# Patient Record
Sex: FEMALE | Race: WHITE | NOT HISPANIC OR LATINO | Employment: FULL TIME | URBAN - METROPOLITAN AREA
[De-identification: names, ages, dates, MRNs, and addresses within clinical notes are randomized per-mention and may not be internally consistent; named-entity substitution may affect disease eponyms.]

---

## 2017-01-04 ENCOUNTER — ALLSCRIPTS OFFICE VISIT (OUTPATIENT)
Dept: OTHER | Facility: OTHER | Age: 22
End: 2017-01-04

## 2017-01-06 ENCOUNTER — GENERIC CONVERSION - ENCOUNTER (OUTPATIENT)
Dept: OTHER | Facility: OTHER | Age: 22
End: 2017-01-06

## 2017-01-19 ENCOUNTER — GENERIC CONVERSION - ENCOUNTER (OUTPATIENT)
Dept: OTHER | Facility: OTHER | Age: 22
End: 2017-01-19

## 2017-01-19 ENCOUNTER — ALLSCRIPTS OFFICE VISIT (OUTPATIENT)
Dept: PERINATAL CARE | Facility: CLINIC | Age: 22
End: 2017-01-19
Payer: COMMERCIAL

## 2017-01-19 PROCEDURE — 76817 TRANSVAGINAL US OBSTETRIC: CPT | Performed by: OBSTETRICS & GYNECOLOGY

## 2017-01-19 PROCEDURE — 76805 OB US >/= 14 WKS SNGL FETUS: CPT | Performed by: OBSTETRICS & GYNECOLOGY

## 2017-01-31 ENCOUNTER — GENERIC CONVERSION - ENCOUNTER (OUTPATIENT)
Dept: OTHER | Facility: OTHER | Age: 22
End: 2017-01-31

## 2017-03-03 ENCOUNTER — GENERIC CONVERSION - ENCOUNTER (OUTPATIENT)
Dept: OTHER | Facility: OTHER | Age: 22
End: 2017-03-03

## 2017-03-03 ENCOUNTER — ALLSCRIPTS OFFICE VISIT (OUTPATIENT)
Dept: OTHER | Facility: OTHER | Age: 22
End: 2017-03-03

## 2017-03-23 ENCOUNTER — LAB REQUISITION (OUTPATIENT)
Dept: LAB | Facility: HOSPITAL | Age: 22
End: 2017-03-23
Payer: COMMERCIAL

## 2017-03-23 ENCOUNTER — GENERIC CONVERSION - ENCOUNTER (OUTPATIENT)
Dept: OTHER | Facility: OTHER | Age: 22
End: 2017-03-23

## 2017-03-23 DIAGNOSIS — Z34.83 ENCOUNTER FOR SUPERVISION OF OTHER NORMAL PREGNANCY, THIRD TRIMESTER: ICD-10-CM

## 2017-03-23 LAB
BASOPHILS # BLD AUTO: 0.01 THOUSANDS/ΜL (ref 0–0.1)
BASOPHILS NFR BLD AUTO: 0 % (ref 0–1)
EOSINOPHIL # BLD AUTO: 0.08 THOUSAND/ΜL (ref 0–0.61)
EOSINOPHIL NFR BLD AUTO: 1 % (ref 0–6)
ERYTHROCYTE [DISTWIDTH] IN BLOOD BY AUTOMATED COUNT: 12.9 % (ref 11.6–15.1)
GLUCOSE 1H P 50 G GLC PO SERPL-MCNC: 138 MG/DL
HCT VFR BLD AUTO: 36.3 % (ref 34.8–46.1)
HGB BLD-MCNC: 12.3 G/DL (ref 11.5–15.4)
LYMPHOCYTES # BLD AUTO: 1.48 THOUSANDS/ΜL (ref 0.6–4.47)
LYMPHOCYTES NFR BLD AUTO: 16 % (ref 14–44)
MCH RBC QN AUTO: 31.2 PG (ref 26.8–34.3)
MCHC RBC AUTO-ENTMCNC: 33.9 G/DL (ref 31.4–37.4)
MCV RBC AUTO: 92 FL (ref 82–98)
MONOCYTES # BLD AUTO: 0.61 THOUSAND/ΜL (ref 0.17–1.22)
MONOCYTES NFR BLD AUTO: 7 % (ref 4–12)
NEUTROPHILS # BLD AUTO: 6.83 THOUSANDS/ΜL (ref 1.85–7.62)
NEUTS SEG NFR BLD AUTO: 76 % (ref 43–75)
NRBC BLD AUTO-RTO: 0 /100 WBCS
PLATELET # BLD AUTO: 195 THOUSANDS/UL (ref 149–390)
PMV BLD AUTO: 10.8 FL (ref 8.9–12.7)
RBC # BLD AUTO: 3.94 MILLION/UL (ref 3.81–5.12)
WBC # BLD AUTO: 9.05 THOUSAND/UL (ref 4.31–10.16)

## 2017-03-23 PROCEDURE — 85025 COMPLETE CBC W/AUTO DIFF WBC: CPT | Performed by: NURSE PRACTITIONER

## 2017-03-23 PROCEDURE — 82950 GLUCOSE TEST: CPT | Performed by: NURSE PRACTITIONER

## 2017-03-27 DIAGNOSIS — R73.09 OTHER ABNORMAL GLUCOSE: ICD-10-CM

## 2017-03-28 ENCOUNTER — APPOINTMENT (OUTPATIENT)
Dept: LAB | Facility: HOSPITAL | Age: 22
End: 2017-03-28
Payer: COMMERCIAL

## 2017-03-28 DIAGNOSIS — R73.09 OTHER ABNORMAL GLUCOSE: ICD-10-CM

## 2017-03-28 LAB
GLUCOSE 1H P 100 G GLC PO SERPL-MCNC: 122 MG/DL (ref 65–179)
GLUCOSE 2H P 100 G GLC PO SERPL-MCNC: 88 MG/DL (ref 65–154)
GLUCOSE 3H P 100 G GLC PO SERPL-MCNC: 94 MG/DL (ref 65–139)
GLUCOSE P FAST SERPL-MCNC: 86 MG/DL (ref 65–99)

## 2017-03-28 PROCEDURE — 82952 GTT-ADDED SAMPLES: CPT

## 2017-03-28 PROCEDURE — 82951 GLUCOSE TOLERANCE TEST (GTT): CPT

## 2017-03-28 PROCEDURE — 36415 COLL VENOUS BLD VENIPUNCTURE: CPT

## 2017-04-11 ENCOUNTER — ALLSCRIPTS OFFICE VISIT (OUTPATIENT)
Dept: OTHER | Facility: OTHER | Age: 22
End: 2017-04-11

## 2017-04-11 ENCOUNTER — GENERIC CONVERSION - ENCOUNTER (OUTPATIENT)
Dept: OTHER | Facility: OTHER | Age: 22
End: 2017-04-11

## 2017-04-27 ENCOUNTER — ALLSCRIPTS OFFICE VISIT (OUTPATIENT)
Dept: OTHER | Facility: OTHER | Age: 22
End: 2017-04-27

## 2017-05-11 ENCOUNTER — GENERIC CONVERSION - ENCOUNTER (OUTPATIENT)
Dept: OTHER | Facility: OTHER | Age: 22
End: 2017-05-11

## 2017-05-18 ENCOUNTER — GENERIC CONVERSION - ENCOUNTER (OUTPATIENT)
Dept: OTHER | Facility: OTHER | Age: 22
End: 2017-05-18

## 2017-05-22 ENCOUNTER — ALLSCRIPTS OFFICE VISIT (OUTPATIENT)
Dept: PERINATAL CARE | Facility: CLINIC | Age: 22
End: 2017-05-22
Payer: COMMERCIAL

## 2017-05-22 ENCOUNTER — GENERIC CONVERSION - ENCOUNTER (OUTPATIENT)
Dept: OTHER | Facility: OTHER | Age: 22
End: 2017-05-22

## 2017-05-22 PROCEDURE — 76816 OB US FOLLOW-UP PER FETUS: CPT | Performed by: OBSTETRICS & GYNECOLOGY

## 2017-05-22 PROCEDURE — 59025 FETAL NON-STRESS TEST: CPT | Performed by: OBSTETRICS & GYNECOLOGY

## 2017-05-22 PROCEDURE — 76821 MIDDLE CEREBRAL ARTERY ECHO: CPT | Performed by: OBSTETRICS & GYNECOLOGY

## 2017-05-22 PROCEDURE — 76820 UMBILICAL ARTERY ECHO: CPT | Performed by: OBSTETRICS & GYNECOLOGY

## 2017-05-24 ENCOUNTER — ALLSCRIPTS OFFICE VISIT (OUTPATIENT)
Dept: OTHER | Facility: OTHER | Age: 22
End: 2017-05-24

## 2017-05-30 ENCOUNTER — GENERIC CONVERSION - ENCOUNTER (OUTPATIENT)
Dept: OTHER | Facility: OTHER | Age: 22
End: 2017-05-30

## 2017-05-30 ENCOUNTER — ALLSCRIPTS OFFICE VISIT (OUTPATIENT)
Dept: PERINATAL CARE | Facility: CLINIC | Age: 22
End: 2017-05-30
Payer: COMMERCIAL

## 2017-05-30 PROCEDURE — 59025 FETAL NON-STRESS TEST: CPT | Performed by: OBSTETRICS & GYNECOLOGY

## 2017-05-30 PROCEDURE — 76820 UMBILICAL ARTERY ECHO: CPT | Performed by: OBSTETRICS & GYNECOLOGY

## 2017-05-30 PROCEDURE — 76821 MIDDLE CEREBRAL ARTERY ECHO: CPT | Performed by: OBSTETRICS & GYNECOLOGY

## 2017-05-30 PROCEDURE — 76815 OB US LIMITED FETUS(S): CPT | Performed by: OBSTETRICS & GYNECOLOGY

## 2017-06-01 ENCOUNTER — ALLSCRIPTS OFFICE VISIT (OUTPATIENT)
Dept: OTHER | Facility: OTHER | Age: 22
End: 2017-06-01

## 2017-06-02 ENCOUNTER — GENERIC CONVERSION - ENCOUNTER (OUTPATIENT)
Dept: OTHER | Facility: OTHER | Age: 22
End: 2017-06-02

## 2017-06-04 ENCOUNTER — HOSPITAL ENCOUNTER (OUTPATIENT)
Facility: HOSPITAL | Age: 22
Discharge: HOME | End: 2017-06-04
Attending: OBSTETRICS & GYNECOLOGY | Admitting: OBSTETRICS & GYNECOLOGY
Payer: COMMERCIAL

## 2017-06-04 VITALS
WEIGHT: 155 LBS | TEMPERATURE: 98.2 F | DIASTOLIC BLOOD PRESSURE: 82 MMHG | BODY MASS INDEX: 24.33 KG/M2 | RESPIRATION RATE: 18 BRPM | SYSTOLIC BLOOD PRESSURE: 128 MMHG | HEIGHT: 67 IN | HEART RATE: 89 BPM

## 2017-06-04 VITALS
DIASTOLIC BLOOD PRESSURE: 81 MMHG | RESPIRATION RATE: 18 BRPM | TEMPERATURE: 98.1 F | BODY MASS INDEX: 24.33 KG/M2 | SYSTOLIC BLOOD PRESSURE: 134 MMHG | HEIGHT: 67 IN | WEIGHT: 155 LBS | HEART RATE: 98 BPM

## 2017-06-04 DIAGNOSIS — R10.9 ABDOMINAL PAIN AFFECTING PREGNANCY: ICD-10-CM

## 2017-06-04 DIAGNOSIS — O26.899 ABDOMINAL PAIN AFFECTING PREGNANCY: ICD-10-CM

## 2017-06-04 DIAGNOSIS — O47.9 FALSE LABOR: ICD-10-CM

## 2017-06-04 PROBLEM — Z3A.39 39 WEEKS GESTATION OF PREGNANCY: Status: ACTIVE | Noted: 2017-06-04

## 2017-06-04 PROCEDURE — 99213 OFFICE O/P EST LOW 20 MIN: CPT

## 2017-06-04 PROCEDURE — 99214 OFFICE O/P EST MOD 30 MIN: CPT

## 2017-06-04 PROCEDURE — G0463 HOSPITAL OUTPT CLINIC VISIT: HCPCS

## 2017-06-05 ENCOUNTER — HOSPITAL ENCOUNTER (INPATIENT)
Facility: HOSPITAL | Age: 22
LOS: 2 days | Discharge: HOME | End: 2017-06-07
Attending: OBSTETRICS & GYNECOLOGY | Admitting: OBSTETRICS & GYNECOLOGY
Payer: COMMERCIAL

## 2017-06-05 DIAGNOSIS — O26.899 ABDOMINAL PAIN AFFECTING PREGNANCY: ICD-10-CM

## 2017-06-05 DIAGNOSIS — R10.9 ABDOMINAL PAIN AFFECTING PREGNANCY: ICD-10-CM

## 2017-06-05 DIAGNOSIS — Z3A.39 39 WEEKS GESTATION OF PREGNANCY: Primary | ICD-10-CM

## 2017-06-05 PROBLEM — O47.9 FALSE LABOR: Status: RESOLVED | Noted: 2017-06-04 | Resolved: 2017-06-05

## 2017-06-05 LAB
ABO GROUP BLD: NORMAL
BASE EXCESS BLDCOA CALC-SCNC: -3.2 MMOL/L (ref 3–11)
BASE EXCESS BLDCOV CALC-SCNC: -3 MMOL/L (ref 1–9)
BASOPHILS # BLD AUTO: 0.02 THOUSANDS/ΜL (ref 0–0.1)
BASOPHILS NFR BLD AUTO: 0 % (ref 0–1)
BLD GP AB SCN SERPL QL: NEGATIVE
EOSINOPHIL # BLD AUTO: 0.04 THOUSAND/ΜL (ref 0–0.61)
EOSINOPHIL NFR BLD AUTO: 0 % (ref 0–6)
ERYTHROCYTE [DISTWIDTH] IN BLOOD BY AUTOMATED COUNT: 13.4 % (ref 11.6–15.1)
HCO3 BLDCOA-SCNC: 25.8 MMOL/L (ref 17.3–27.3)
HCO3 BLDCOV-SCNC: 22.3 MMOL/L (ref 12.2–28.6)
HCT VFR BLD AUTO: 37.3 % (ref 34.8–46.1)
HGB BLD-MCNC: 13.1 G/DL (ref 11.5–15.4)
LYMPHOCYTES # BLD AUTO: 1.48 THOUSANDS/ΜL (ref 0.6–4.47)
LYMPHOCYTES NFR BLD AUTO: 10 % (ref 14–44)
MCH RBC QN AUTO: 32 PG (ref 26.8–34.3)
MCHC RBC AUTO-ENTMCNC: 35.1 G/DL (ref 31.4–37.4)
MCV RBC AUTO: 91 FL (ref 82–98)
MONOCYTES # BLD AUTO: 1.53 THOUSAND/ΜL (ref 0.17–1.22)
MONOCYTES NFR BLD AUTO: 10 % (ref 4–12)
NEUTROPHILS # BLD AUTO: 12.41 THOUSANDS/ΜL (ref 1.85–7.62)
NEUTS SEG NFR BLD AUTO: 80 % (ref 43–75)
NRBC BLD AUTO-RTO: 0 /100 WBCS
O2 CT VFR BLDCOA CALC: 11.5 ML/DL
OXYHGB MFR BLDCOA: 48 %
OXYHGB MFR BLDCOV: 79.2 %
PCO2 BLDCOA: 61.8 MM[HG] (ref 30–60)
PCO2 BLDCOV: 40.8 MM HG (ref 27–43)
PH BLDCOA: 7.24 [PH] (ref 7.23–7.43)
PH BLDCOV: 7.36 [PH] (ref 7.19–7.49)
PLATELET # BLD AUTO: 152 THOUSANDS/UL (ref 149–390)
PMV BLD AUTO: 11.9 FL (ref 8.9–12.7)
PO2 BLDCOA: 24.3 MM HG (ref 5–25)
PO2 BLDCOV: 36.4 MM HG (ref 15–45)
RBC # BLD AUTO: 4.09 MILLION/UL (ref 3.81–5.12)
RH BLD: POSITIVE
SAO2 % BLDCOV: 18.4 ML/DL
SPECIMEN EXPIRATION DATE: NORMAL
WBC # BLD AUTO: 15.48 THOUSAND/UL (ref 4.31–10.16)

## 2017-06-05 PROCEDURE — 86850 RBC ANTIBODY SCREEN: CPT | Performed by: OBSTETRICS & GYNECOLOGY

## 2017-06-05 PROCEDURE — A9270 NON-COVERED ITEM OR SERVICE: HCPCS | Performed by: OBSTETRICS & GYNECOLOGY

## 2017-06-05 PROCEDURE — 0UQMXZZ REPAIR VULVA, EXTERNAL APPROACH: ICD-10-PCS | Performed by: OBSTETRICS & GYNECOLOGY

## 2017-06-05 PROCEDURE — 86592 SYPHILIS TEST NON-TREP QUAL: CPT | Performed by: OBSTETRICS & GYNECOLOGY

## 2017-06-05 PROCEDURE — 0KQM0ZZ REPAIR PERINEUM MUSCLE, OPEN APPROACH: ICD-10-PCS | Performed by: OBSTETRICS & GYNECOLOGY

## 2017-06-05 PROCEDURE — 82805 BLOOD GASES W/O2 SATURATION: CPT | Performed by: OBSTETRICS & GYNECOLOGY

## 2017-06-05 PROCEDURE — 10907ZC DRAINAGE OF AMNIOTIC FLUID, THERAPEUTIC FROM PRODUCTS OF CONCEPTION, VIA NATURAL OR ARTIFICIAL OPENING: ICD-10-PCS | Performed by: OBSTETRICS & GYNECOLOGY

## 2017-06-05 PROCEDURE — G0463 HOSPITAL OUTPT CLINIC VISIT: HCPCS

## 2017-06-05 PROCEDURE — 99213 OFFICE O/P EST LOW 20 MIN: CPT

## 2017-06-05 PROCEDURE — 85025 COMPLETE CBC W/AUTO DIFF WBC: CPT | Performed by: OBSTETRICS & GYNECOLOGY

## 2017-06-05 PROCEDURE — 86900 BLOOD TYPING SEROLOGIC ABO: CPT | Performed by: OBSTETRICS & GYNECOLOGY

## 2017-06-05 PROCEDURE — 86901 BLOOD TYPING SEROLOGIC RH(D): CPT | Performed by: OBSTETRICS & GYNECOLOGY

## 2017-06-05 RX ORDER — OXYCODONE HYDROCHLORIDE AND ACETAMINOPHEN 5; 325 MG/1; MG/1
1 TABLET ORAL EVERY 4 HOURS PRN
Status: DISCONTINUED | OUTPATIENT
Start: 2017-06-05 | End: 2017-06-07 | Stop reason: HOSPADM

## 2017-06-05 RX ORDER — IBUPROFEN 600 MG/1
600 TABLET ORAL EVERY 6 HOURS PRN
Status: DISCONTINUED | OUTPATIENT
Start: 2017-06-05 | End: 2017-06-07 | Stop reason: HOSPADM

## 2017-06-05 RX ORDER — SIMETHICONE 80 MG
80 TABLET,CHEWABLE ORAL 4 TIMES DAILY PRN
Status: DISCONTINUED | OUTPATIENT
Start: 2017-06-05 | End: 2017-06-07 | Stop reason: HOSPADM

## 2017-06-05 RX ORDER — MORPHINE SULFATE 10 MG/ML
10 INJECTION, SOLUTION INTRAMUSCULAR; INTRAVENOUS ONCE
Status: DISCONTINUED | OUTPATIENT
Start: 2017-06-05 | End: 2017-06-05

## 2017-06-05 RX ORDER — ALBUTEROL SULFATE 90 UG/1
2 AEROSOL, METERED RESPIRATORY (INHALATION) EVERY 6 HOURS PRN
Status: DISCONTINUED | OUTPATIENT
Start: 2017-06-05 | End: 2017-06-05

## 2017-06-05 RX ORDER — MORPHINE SULFATE 10 MG/ML
10 INJECTION, SOLUTION INTRAMUSCULAR; INTRAVENOUS ONCE
Status: COMPLETED | OUTPATIENT
Start: 2017-06-05 | End: 2017-06-05

## 2017-06-05 RX ORDER — TERBUTALINE SULFATE 1 MG/ML
INJECTION, SOLUTION SUBCUTANEOUS
Status: DISCONTINUED
Start: 2017-06-05 | End: 2017-06-05 | Stop reason: WASHOUT

## 2017-06-05 RX ORDER — DIAPER,BRIEF,INFANT-TODD,DISP
1 EACH MISCELLANEOUS AS NEEDED
Status: DISCONTINUED | OUTPATIENT
Start: 2017-06-05 | End: 2017-06-07 | Stop reason: HOSPADM

## 2017-06-05 RX ORDER — SODIUM CHLORIDE, SODIUM LACTATE, POTASSIUM CHLORIDE, CALCIUM CHLORIDE 600; 310; 30; 20 MG/100ML; MG/100ML; MG/100ML; MG/100ML
125 INJECTION, SOLUTION INTRAVENOUS CONTINUOUS
Status: DISCONTINUED | OUTPATIENT
Start: 2017-06-05 | End: 2017-06-05

## 2017-06-05 RX ORDER — OXYCODONE HYDROCHLORIDE AND ACETAMINOPHEN 5; 325 MG/1; MG/1
2 TABLET ORAL EVERY 4 HOURS PRN
Status: DISCONTINUED | OUTPATIENT
Start: 2017-06-05 | End: 2017-06-07 | Stop reason: HOSPADM

## 2017-06-05 RX ORDER — BUPIVACAINE HYDROCHLORIDE 2.5 MG/ML
INJECTION, SOLUTION EPIDURAL; INFILTRATION; INTRACAUDAL
Status: COMPLETED
Start: 2017-06-05 | End: 2017-06-05

## 2017-06-05 RX ORDER — ONDANSETRON 2 MG/ML
4 INJECTION INTRAMUSCULAR; INTRAVENOUS ONCE
Status: DISCONTINUED | OUTPATIENT
Start: 2017-06-05 | End: 2017-06-05

## 2017-06-05 RX ORDER — ACETAMINOPHEN 325 MG/1
650 TABLET ORAL EVERY 6 HOURS PRN
Status: DISCONTINUED | OUTPATIENT
Start: 2017-06-05 | End: 2017-06-07 | Stop reason: HOSPADM

## 2017-06-05 RX ORDER — DIPHENHYDRAMINE HCL 25 MG
25 TABLET ORAL EVERY 6 HOURS PRN
Status: DISCONTINUED | OUTPATIENT
Start: 2017-06-05 | End: 2017-06-07 | Stop reason: HOSPADM

## 2017-06-05 RX ORDER — DOCUSATE SODIUM 100 MG/1
100 CAPSULE, LIQUID FILLED ORAL 2 TIMES DAILY
Status: DISCONTINUED | OUTPATIENT
Start: 2017-06-05 | End: 2017-06-07 | Stop reason: HOSPADM

## 2017-06-05 RX ORDER — OXYTOCIN/RINGER'S LACTATE 30/500 ML
PLASTIC BAG, INJECTION (ML) INTRAVENOUS
Status: COMPLETED
Start: 2017-06-05 | End: 2017-06-05

## 2017-06-05 RX ADMIN — SODIUM CHLORIDE 2.5 MILLION UNITS: 9 INJECTION, SOLUTION INTRAVENOUS at 15:45

## 2017-06-05 RX ADMIN — BUPIVACAINE HYDROCHLORIDE 30 ML: 2.5 INJECTION, SOLUTION EPIDURAL; INFILTRATION; INTRACAUDAL at 17:30

## 2017-06-05 RX ADMIN — SODIUM CHLORIDE, SODIUM LACTATE, POTASSIUM CHLORIDE, AND CALCIUM CHLORIDE 999 ML/HR: .6; .31; .03; .02 INJECTION, SOLUTION INTRAVENOUS at 16:29

## 2017-06-05 RX ADMIN — SODIUM CHLORIDE 5 MILLION UNITS: 0.9 INJECTION, SOLUTION INTRAVENOUS at 11:45

## 2017-06-05 RX ADMIN — BENZOCAINE AND MENTHOL: 20; .5 SPRAY TOPICAL at 20:25

## 2017-06-05 RX ADMIN — Medication 30 UNITS: at 17:14

## 2017-06-05 RX ADMIN — MORPHINE SULFATE 10 MG: 10 INJECTION, SOLUTION INTRAMUSCULAR; INTRAVENOUS at 05:30

## 2017-06-05 RX ADMIN — WITCH HAZEL 1 PAD: 500 SOLUTION RECTAL; TOPICAL at 20:25

## 2017-06-06 LAB — RPR SER QL: NORMAL

## 2017-06-06 PROCEDURE — A9270 NON-COVERED ITEM OR SERVICE: HCPCS | Performed by: OBSTETRICS & GYNECOLOGY

## 2017-06-06 RX ADMIN — DOCUSATE SODIUM 100 MG: 100 CAPSULE, LIQUID FILLED ORAL at 17:49

## 2017-06-07 VITALS
HEIGHT: 67 IN | BODY MASS INDEX: 24.33 KG/M2 | TEMPERATURE: 98.1 F | DIASTOLIC BLOOD PRESSURE: 62 MMHG | RESPIRATION RATE: 14 BRPM | HEART RATE: 61 BPM | SYSTOLIC BLOOD PRESSURE: 100 MMHG | WEIGHT: 155 LBS

## 2017-06-07 PROCEDURE — A9270 NON-COVERED ITEM OR SERVICE: HCPCS | Performed by: OBSTETRICS & GYNECOLOGY

## 2017-06-07 RX ORDER — DOCUSATE SODIUM 100 MG/1
100 CAPSULE, LIQUID FILLED ORAL 2 TIMES DAILY
Qty: 10 CAPSULE | Refills: 0
Start: 2017-06-07 | End: 2018-06-24

## 2017-06-07 RX ORDER — IBUPROFEN 600 MG/1
600 TABLET ORAL EVERY 6 HOURS PRN
Qty: 30 TABLET | Refills: 0
Start: 2017-06-07 | End: 2018-06-24

## 2017-06-07 RX ADMIN — DOCUSATE SODIUM 100 MG: 100 CAPSULE, LIQUID FILLED ORAL at 08:39

## 2017-06-09 ENCOUNTER — TELEPHONE (OUTPATIENT)
Dept: LABOR AND DELIVERY | Facility: HOSPITAL | Age: 22
End: 2017-06-09

## 2017-06-14 LAB — PLACENTA IN STORAGE: NORMAL

## 2017-07-14 ENCOUNTER — ALLSCRIPTS OFFICE VISIT (OUTPATIENT)
Dept: OTHER | Facility: OTHER | Age: 22
End: 2017-07-14

## 2017-12-06 ENCOUNTER — GENERIC CONVERSION - ENCOUNTER (OUTPATIENT)
Dept: OTHER | Facility: OTHER | Age: 22
End: 2017-12-06

## 2017-12-06 ENCOUNTER — ALLSCRIPTS OFFICE VISIT (OUTPATIENT)
Dept: OTHER | Facility: OTHER | Age: 22
End: 2017-12-06

## 2018-01-09 NOTE — RESULT NOTES
Verified Results  [facility] Mehran Montez [id number] 06:40PM Tarun Mcclendon Order Number: [id number]    - Patient Instructions: To schedule this appointment, please contact Central Scheduling at [phone]  Test Name Result Flag Reference   US OB TRANSVAGINAL (Report)     FIRST TRIMESTER OBSTETRIC ULTRASOUND, COMPLETE     INDICATION: Dating ultrasound  LMP is 8/16/2016  COMPARISON: None  TECHNIQUE:  Transabdominal ultrasound of the pelvis was performed  Additional transvaginal imaging was then performed to better assess the gestation, myometrial/endometrial architecture and ovarian parenchymal detail  The study includes volumetric    sweeps and traditional still imaging technique  FINDINGS:     A single live intrauterine gestation is identified  Cardiac activity is present  Heart rate of 120 bpm      YOLK SAC: Present and normal in size and appearance  MEAN GESTATIONAL SAC SIZE: 25 mm = 7 weeks 1 days    MEAN CROWN RUMP LENGTH: 7 mm = 6 weeks 4 days    FETAL ANATOMY: Appropriate for gestational age  AMNIOTIC FLUID/SAC SHAPE: Within expected normal range  PLACENTA: The placenta can not be adequately assessed at this early gestational age  There is no significant subchorionic fluid collection  UTERUS/ADNEXA:    The uterus and ovaries are within normal limits  Right corpus luteum cyst is noted  The cervix remains closed  There is trace free fluid  IMPRESSION:     Single live intrauterine gestation at 6 weeks 4 days (range +/- 4 days)  EVANGELINA of 6/10/2017  Workstation performed: [id number]     Signed by:   Paris Ch MD   10/21/16      OB < 14 WEEKS SINGLE OR FIRST DESTINATION LEVEL 2 [id number] 06:08PM Ofelia CHOI Order Number: [id number]    - Patient Instructions: To schedule this appointment, please contact Central Scheduling at [phone]       Test Name Result Flag Reference   US OB < 14 WEEKS SINGLE OR FIRST GESTATION (Report)     FIRST TRIMESTER OBSTETRIC ULTRASOUND, COMPLETE     INDICATION: Dating ultrasound  LMP is 8/16/2016  COMPARISON: None  TECHNIQUE:  Transabdominal ultrasound of the pelvis was performed  Additional transvaginal imaging was then performed to better assess the gestation, myometrial/endometrial architecture and ovarian parenchymal detail  The study includes volumetric    sweeps and traditional still imaging technique  FINDINGS:     A single live intrauterine gestation is identified  Cardiac activity is present  Heart rate of 120 bpm      YOLK SAC: Present and normal in size and appearance  MEAN GESTATIONAL SAC SIZE: 25 mm = 7 weeks 1 days    MEAN CROWN RUMP LENGTH: 7 mm = 6 weeks 4 days    FETAL ANATOMY: Appropriate for gestational age  AMNIOTIC FLUID/SAC SHAPE: Within expected normal range  PLACENTA: The placenta can not be adequately assessed at this early gestational age  There is no significant subchorionic fluid collection  UTERUS/ADNEXA:    The uterus and ovaries are within normal limits  Right corpus luteum cyst is noted  The cervix remains closed  There is trace free fluid  IMPRESSION:     Single live intrauterine gestation at 6 weeks 4 days (range +/- 4 days)  EVANGELINA of 6/10/2017         Workstation performed: [id number]     Signed by:   Janeen Koroma MD   10/20/16

## 2018-01-09 NOTE — RESULT NOTES
Verified Results  (1) PRENATAL PANEL [id number] 05:13PM Elnor Page     Test Name Result Flag Reference   BASOPHILS ABSOLUTE COUNT 0 02 Thousands/?L  0 00-0 10   EOSINOPHILS ABSOLUTE COUNT 0 12 Thousand/?L  0 00-0 61   LYMPHOCYTES ABSOLUTE COUNT 1 99 Thousands/?L  0 60-4 47   MONOCYTES ABSOLUTE COUNT 0 65 Thousand/?L  0 17-1 22   NEUTROPHILS ABSOLUTE COUNT 6 81 Thousands/?L  1 85-7 62   BASOPHILS RELATIVE PERCENT 0 %  0-1   EOSINOPHILS RELATIVE PERCENT 1 %  0-6   HEMATOCRIT 41 6 %  34 8-46 1   HEMOGLOBIN 14 1 g/dL  11 5-15 4   LYMPHOCYTES RELATIVE PERCENT 21 %  14-44   MCH 30 9 pg  26 8-34 3   MCHC 33 9 g/dL  31 4-37 4   MCV 91 fL  82-98   MONOCYTES RELATIVE PERCENT 7 %  4-12   nRBC AUTOMATED 0 /100 WBCs     PLATELET COUNT 594 Thousands/uL  149-390   MPV 10 2 fL  8 9-12 7   RBC COUNT 4 57 Million/uL  3 81-5 12   RDW 12 7 %  11 6-15 1   NEUTROPHILS RELATIVE PERCENT 71 %  43-75   WBC COUNT 9 61 Thousand/uL  4 31-10 16     (1) PRENATAL PANEL [id number] 05:13PM Elnor Page     Test Name Result Flag Reference   HEPATITIS B SURFACE ANTIGEN Non-reactive  Non-reactive, NonReactive - Confirmed     (1) PRENATAL PANEL [id number] 05:13PM Elnor Page     Test Name Result Flag Reference   BILIRUBIN UA Negative  Negative   CLARITY Cloudy     COLOR Yellow     KETONES UA Negative mg/dl  Negative   LEUKOCYTE ESTERASE UA Negative  Negative   NITRITE UA Negative  Negative   BLOOD UA Negative  Negative   PH UA 8 0  4 5-8 0   PROTEIN UA Negative mg/dl  Negative   SPECIFIC GRAVITY UA 1 017  1 003-1 030   GLUCOSE UA Negative mg/dl  Negative   UROBILINOGEN UA 0 2 E U /dl  0 2, 1 0 E U /dl     (1) PRENATAL PANEL [id number] 05:13PM Elnor Page     Test Name Result Flag Reference   ABO GROUPING A     RH FACTOR Positive     ANTIBODY SCREEN Negative       (1) PRENATAL PANEL [id number] 05:13PM Elnor Page     Test Name Result Flag Reference   HIV 1/2 AND P24 Non-Reactive  Non-Reactive   This test detects HIV 1, HIV2 and p24 Antigen  (1) PRENATAL PANEL [id number] 05:13PM Elnor Page     Test Name Result Flag Reference   RPR Non-Reactive  Non-Reactive     (1) URINE CULTURE [id number] 05:13PM Elnor Page     Test Name Result Flag Reference   CLINICAL REPORT (Report)     Test:        Urine culture  Specimen Type:   Urine  Specimen Date:   11/3/2016 5:13 PM  Result Date:    11/4/2016 4:40 PM  Result Status:   Final result  Resulting Lab:   [place] [address]            Tel: [phone]      CULTURE                                       ------------------                                   <10,000 cfu/ml beta hemolytic Streptococcus Group B     *** This organism is intrinisically susceptible to Penicillin  If sensitivites to other antibiotics are required, please call the      Microbiology Department at [phone] within 5 days  ***    30,000-39,000 cfu/ml Mixed Contaminants X3     (1) PRENATAL PANEL [id number] 05:13PM Elnor Page     Test Name Result Flag Reference   RUBELLA (IGG)   >9 9   SEE WRITTEN REPORT FROM [facility] IU/mL   SEE WRITTEN REPORT FROM [facility]       Plan  Encounter for supervision of normal first pregnancy in first trimester    · (1) PRENATAL PANEL; Status:Active; Requested for:03Nov2016;    · (1) URINE CULTURE; Source:Urine, Clean Catch; Status:Active;  Requested  SQD:[id number];

## 2018-01-10 NOTE — PROGRESS NOTES
DEC 7 2016         RE: Kadeem Phelan                                    To: Ob/Gyn Care   [facility]   MR#: [id number]                                    [address]   :  [place] [address] [facility]    ENC: [id number]                             Fax: [phone]   (Exam #: [id number])      The LMP of this 24year old,  G1, P0-0-0-0 patient was AUG 16 2016, her   working EVANGELINA is ESAU 10 2017 and the current gestational age is 17 weeks 4   days by 1st Trimester Sono  A sonographic examination was performed on DEC   7 2016 using real time equipment  The ultrasound examination was performed   using abdominal technique  The patient has a BMI of 21 9  Her blood   pressure today was 119/78  Earliest ultrasound found in her record: 10/19/16  6w4d  EVANGELINA 6/10/17      Thank you very much for your kind referral of Kadeem Phelan to the [facility] in [facility] on 2016 for first trimester ultrasound   evaluation and  assessment  Lisette Irene is a 79-year-old primigravida   who is currently at 13-4/7 weeks gestation by an estimated due date of   Izabel 10, 2017  With the exception of occasional nausea and vomiting, her   prenatal course has been unremarkable  Lisette Irene has no complaints  She denies   vaginal bleeding  Her past medical history is unremarkable  Her past   surgical history is significant for several orthopedic surgeries involving   fractures of her elbow and wrist  She takes no medication currently with   the exception of a prenatal vitamin on a daily basis  She denies tobacco,   alcohol, or illicit drug use during the pregnancy  She recently received   the influenza vaccine  The family genetic history is negative with respect   to genetic abnormalities, birth defects, or mental retardation        Multiple longitudinal and transverse sections revealed a wisdom   intrauterine pregnancy with the fetus in variable presentation  The   placenta is anterior in implantation  Cardiac motion was observed at 161 bpm       INDICATIONS      confirm gestational age      Exam Types      Level I      RESULTS      Fetus # 1 of 1   Variable presentation   Fetal growth appeared normal      MEASUREMENTS (* Included In Average GA)      CRL              7 1 cm        13 weeks 1 day *   Nuchal Trans    1 70 mm      THE AVERAGE GESTATIONAL AGE is 13 weeks 1 day +/- 7 days  ANATOMY COMMENTS      Anatomic detail is limited at this gestational age  The yolk sac was not   noted  The fetal cranium appeared normal in shape and the nuchal   translucency was normal in size at 1 7 mm  The nasal bone appears to be   present  The intracranial anatomy was unremarkable  Evaluation of the   spine is suboptimal secondary to unfavorable fetal position  Anatomy of   the fetal thorax appeared within normal limits  The cardiac rhythm was   regular  The four-chamber and 3 vessel tracheal views appear normal     Within the abdomen, stomach & bladder were visualized and the abdominal   wall appeared intact  A three vessel cord appears to be present  Active   movement of the fetal body & extremities was seen  There is no suspicion   of a subchorionic bleed  The placental cord insertion was normal    There   is no suspicion of a uterine myoma  Free fluid is not seen in the   posterior cul-de-sac  ADNEXA      The left ovary appeared normal and measured 3 0 x 2 2 x 1 3 cm with a   volume of 4 5 cc  The right ovary appeared normal and measured 3 4 x 2 3 x   2 8 cm with a volume of 11 5 cc        AMNIOTIC FLUID         Largest Vertical Pocket = 3 6 cm   Amniotic Fluid: Normal      IMPRESSION      Burroughs IUP   13 weeks and 1 day by this ultrasound  (EVANGELINA=JUN 13 2017)   13 weeks and 4 days by 1st Tri Sono  (EVANGELINA=ESAU 10 2017)   Variable presentation   Fetal growth appeared normal   Regular fetal heart rate of 161 bpm   Anterior placenta      GENERAL COMMENT      Today's ultrasound findings and suggested follow-up were discussed in   detail with [facility]  We discussed the Stepwise Sequential Screen in detail,   including the sensitivity for detection of Down syndrome  We also   discussed that definitive prenatal diagnosis is possible only through   genetic amniocentesis or chorionic villus sampling  Vandana declined genetic   testing at the visit today  She will return to the  center at 20   weeks gestation for level II ultrasound evaluation  The face to face time, in addition to time spent discussing ultrasound   results, was approximately 10 minutes, greater than 50% of which was spent   during counseling and coordination of care  DARIEL Og M D     Maternal-Fetal Medicine   Electronically signed 16 14:36

## 2018-01-11 NOTE — PROGRESS NOTES
2017         RE: Aniket He                                  To: Ob/Gyn Care   [facility] [place]   MR#: [id number]                                    [address]   :  [address], [address] [facility]    ENC: [id number]                             Fax: [phone]   (Exam #: [id number])      The LMP of this 24year old,  G1, P0-0-0-0 patient was AUG 16 2016, her   working EVANGELINA is ESAU 10 2017 and the current gestational age is 24 weeks 5   days by 1st Trimester Sono  A sonographic examination was performed on 2017 using real time equipment  The ultrasound examination was   performed using abdominal & vaginal techniques  The patient has a BMI of   22 6  Her blood pressure today was 135/86  Earliest ultrasound found in her record: 10/19/16  6w4d  EVANGELINA 6/10/17      Cardiac motion was observed at 155 bpm       INDICATIONS      fetal anatomical survey      Exam Types      LEVEL II   Transvaginal      RESULTS      Fetus # 1 of 1   Vertex presentation   Fetal growth appeared normal   Placenta Location = Left lateral   No placenta previa   Placenta Grade = I      MEASUREMENTS (* Included In Average GA)      AC              14 3 cm        19 weeks 3 days* (45%)   BPD              4 6 cm        19 weeks 6 days* (57%)   HC              17 0 cm        19 weeks 4 days* (42%)   Femur            3 1 cm        19 weeks 5 days* (39%)      Nuchal Fold      5 0 mm   NBL              5 1 mm      Humerus          2 9 cm        19 weeks 1 day   (42%)      Cerebellum       2 0 cm        20 weeks 0 days   Biorbit          3 1 cm        20 weeks 1 day   CisternaMagna    5 4 mm      HC/AC           1 18   FL/AC           0 21   FL/BPD          0 67   EFW (Ac/Fl/Hc)   300 grams - 0 lbs 11 oz      THE AVERAGE GESTATIONAL AGE is 19 weeks 4 days +/- 10 days        AMNIOTIC FLUID         Largest Vertical Pocket = 4 8 cm   Amniotic Fluid: Normal CERVICAL EVALUATION      The cervix appeared normal (Ultrasound Examination)  SUPINE      Cervical Length: 3 30 cm      OTHER TEST RESULTS           Funneling?: No             Dynamic Changes?: No        Resp  To TFP?: No      ANATOMY      Head                                    Normal   Face/Neck                               Normal   Th  Cav  Normal   Heart                                   Normal   Abd  Cav  Normal   Stomach                                 Normal   Right Kidney                            Normal   Left Kidney                             Normal   Bladder                                 Normal   Abd  Wall                               Normal   Spine                                   Normal   Extrems                                 Normal   Genitalia                               Normal   Placenta                                Normal   Umbl  Cord                              Normal   Uterus                                  Normal   PCI                                     Normal      ANATOMY DETAILS      Visualized Appearing Sonographically Normal:   HEAD: (Calvarium, BPD Level, Cavum, Lateral Ventricles, Choroid Plexus,   Cerebellum, Cisterna Magna);    FACE/NECK: (Neck, Nuchal Fold, Profile,   Orbits, Nose/Lips, Palate, Face);    TH  CAV  : (Lungs, Diaphragm); HEART: (Four Chamber View, Proximal Left Outflow, Proximal Right Outflow,   3VV, 3 Vessel Trachea, Short Axis of Greater Vessels, Ductal Arch, Aortic   Arch, Interventricular Septum, Interatrial Septum, IVC, SVC, Cardiac Axis,   Cardiac Position);    ABD  CAV : (Liver);    STOMACH, RIGHT KIDNEY, LEFT   KIDNEY, BLADDER, ABD  WALL, SPINE: (Cervical Spine, Thoracic Spine, Lumbar   Spine, Sacrum);    EXTREMS: (Lt Humerus, Rt Humerus, Lt Forearm, Rt   Forearm, Lt Hand, Rt Hand, Lt Femur, Rt Femur, Lt Low Leg, Rt Low Leg, Lt   Foot, Rt Foot);    GENITALIA (Male), PLACENTA, UMBL  CORD, UTERUS, PCI      ADNEXA      The left ovary appeared normal and measured 3 3 x 2 0 x 1 7 cm with a   volume of 5 9 cc  The right ovary was not visualized  IMPRESSION      Burroughs IUP   19 weeks and 4 days by this ultrasound  (EVANGELINA=2017)   19 weeks and 5 days by 1st Tri Sono  (EVANGELINA=ESAU 10 2017)   Vertex presentation   Fetal growth appeared normal   Normal anatomy survey   Regular fetal heart rate of 155 bpm   Left lateral placenta   No placenta previa      GENERAL COMMENT      On exam today the patient appears well, in no acute distress, and denies   any complaints  Her abdomen is non-tender  The patient has declined genetic screening, and we discussed that a normal   ultrasound does not exclude all congenital birth defects or karyotypic   abnormalities  The fetal anatomic survey is complete  There is no sonographic evidence   of fetal abnormalities at this time  Good fetal movement and tone are   seen  The amniotic fluid volume appears normal   The placenta is left   lateral and it appears sonographically normal   A transvaginal ultrasound   was performed to assess the cervix, which was not seen well   transabdominally  The cervical length was 3 3 centimeters, which is   normal for the current gestational age  There was no significant   funneling or dynamic changes appreciated  The patient was informed of   today's findings and all of her questions were answered  The limitations   of ultrasound were reviewed with the patient, which she accepts  Vandana and her  had questions regarding follow-up ultrasounds  I   discussed with them that she does not appear to have any significant risk   factors to schedule a routine fetal growth evaluation and she will return   to the  Center as clinically indicated based upon her obstetrical   care        Please note, in addition to the time spent discussing the results of the   ultrasound, I spent approximately 10 minutes of face-to-face time with the   patient, greater than 50% of which was spent in counseling and the   coordination of care for this patient  Thank you very much for allowing us to participate in the care of this   very nice patient  Should you have any questions, please do not hesitate   to contact our office  DARIEL Og M D     Electronically signed 01/19/17 17:57

## 2018-01-12 VITALS
DIASTOLIC BLOOD PRESSURE: 75 MMHG | WEIGHT: 152 LBS | SYSTOLIC BLOOD PRESSURE: 123 MMHG | BODY MASS INDEX: 23.86 KG/M2 | HEIGHT: 67 IN

## 2018-01-12 VITALS
BODY MASS INDEX: 24.26 KG/M2 | WEIGHT: 154.56 LBS | HEIGHT: 67 IN | SYSTOLIC BLOOD PRESSURE: 106 MMHG | DIASTOLIC BLOOD PRESSURE: 72 MMHG

## 2018-01-13 VITALS
SYSTOLIC BLOOD PRESSURE: 122 MMHG | HEIGHT: 67 IN | WEIGHT: 155 LBS | BODY MASS INDEX: 24.33 KG/M2 | DIASTOLIC BLOOD PRESSURE: 85 MMHG

## 2018-01-13 NOTE — MISCELLANEOUS
Message  Return to work or school:   Robert Johnson is under my professional care  She was seen in my office on 1/4/2017   She is able to return to work on  1/6/2017            Signatures   Electronically signed by :  MINISTERIO Stanley ; Jan 4 2017  7:04PM EST                       (Author)

## 2018-01-13 NOTE — PROGRESS NOTES
Chief Complaint  Pt presents today for a hcg, prog and t&s blood draw  LMP 8/16/16      Active Problems    1  Birth Control Method - Oral Contraceptives    Current Meds   1  Generess FE 0 8-25 MG-MCG Oral Tablet Chewable; CHEW OR SWALLOW 1 TABLET   DAILY; Therapy: [id number] to (Evaluate:18Oct2013)  Requested for: [id number]; Last   Rx:16Nov2012 Recorded    Allergies    1  No Known Drug Allergies    Future Appointments    Date/Time Provider Specialty Site   10/11/2016 10:00 AM [facility]  [facility]     Signatures   Electronically signed by :  MINISTERIO Ewing ; Oct  5 2016  8:38PM EST                       (Author)

## 2018-01-14 VITALS
SYSTOLIC BLOOD PRESSURE: 135 MMHG | BODY MASS INDEX: 22.6 KG/M2 | HEIGHT: 67 IN | WEIGHT: 144 LBS | DIASTOLIC BLOOD PRESSURE: 86 MMHG

## 2018-01-14 VITALS
BODY MASS INDEX: 23.89 KG/M2 | DIASTOLIC BLOOD PRESSURE: 82 MMHG | HEIGHT: 67 IN | SYSTOLIC BLOOD PRESSURE: 132 MMHG | WEIGHT: 152.19 LBS

## 2018-01-14 NOTE — PROGRESS NOTES
MAY 22 2017         RE: Dona Joel                                  To: C[facility]   MR#: [id number]                                    [address] [facility] [address]   : APR [facility], [address] [place] [address]    ENC: [id number]                             Fax: [phone]   (Exam #: [id number])      The LMP of this 20year old,  G1, P0-0-0-0 patient was AUG 16 2016, her   working EVANGELINA is ESAU 10 2017 and the current gestational age is 42 weeks 2   days by [address] [place] [address]  A sonographic examination was performed on MAY   22 2017 using real time equipment  The ultrasound examination was   performed using abdominal technique  The patient has a BMI of 23 8  Her   blood pressure today was 123/75  Earliest ultrasound found in her record: 10/19/16  6w4d  EVANGELINA 6/10/17      Helga Tejeda has no complaints today  She reports regular fetal movement and   denies problems related to vaginal bleeding,  labor, or   hypertension  Screening for gestational diabetes on  revealed an   elevated one-hour post Glucola value of 138 mg/dL  A diagnostic three-hour   glucose tolerance test performed on  revealed 4 out of 4 normal   values  She is referred for MFM evaluation today for the indication of   clinical size less than dates at a recent office prenatal visit  Cardiac motion was observed at 126 bpm       INDICATIONS      size less than dates      Exam Types      Level I   NST   Doppler study      RESULTS      Fetus # 1 of 1   Vertex presentation   Probable symmetric IUGR   Placenta Location = Anterior   Placenta Grade = II      The NST was reactive with no decelerations        MEASUREMENTS (* Included In Average GA)      AC              30 2 cm        34 weeks 1 day * (<5%)   BPD              8 7 cm        35 weeks 1 day * (22%)   HC              30 7 cm        33 weeks 5 days* (<5%)   Femur            6 6 cm        33 weeks 6 days* (<5%)      Cerebellum       4 7 cm        36 weeks 0 days      HC/AC           1 02   FL/AC           0 22   FL/BPD          0 76   EFW (Ac/Fl/Hc)  2342 grams - 5 lbs 3 oz                 (<5%)      THE AVERAGE GESTATIONAL AGE is 34 weeks 1 day +/- 21 days  AMNIOTIC FLUID      Q1: 6 9      Q2: 6 5      Q3: 4 0      Q4: 3 5   JUSTYNA Total = 20 9 cm   Amniotic Fluid: Normal      FETAL VESSELS                                     S/D   PI    RI    PSV   AEDV RF                                                    cm/s       Umbilical Artery                 0 79       Middle Cerebral Artery           1 72      ANATOMY DETAILS      Visualized Appearing Sonographically Normal:   HEAD: (Calvarium, BPD Level, Cavum, Lateral Ventricles, Cerebellum);      FACE/NECK: (Profile, Palate);    TH  CAV : (Diaphragm); HEART: (Four   Chamber View, Proximal Right Outflow, 3VV, 3 Vessel Trachea,   Interventricular Septum, Interatrial Septum, Cardiac Axis, Cardiac   Position);    STOMACH, RIGHT KIDNEY, LEFT KIDNEY, BLADDER, GENITALIA   (Male), PLACENTA      Not Visualized:   HEART: (Proximal Left Outflow)      BIOPHYSICAL PROFILE      The Biophysical Profile score was 10/10  Breathin  Movement: 2  Tone: 2  AFV: 2  NST: 2   The NST was reactive with no decelerations  IMPRESSION      Burroughs IUP   34 weeks and 1 day by this ultrasound  (EVANGELINA=2017)   37 weeks and 2 days by 1st Tri Sono  (EVANGELINA=ESAU 10 2017)   Vertex presentation   Growth assessment indicated probable symmetric IUGR   Regular fetal heart rate of 126 bpm   Anterior placenta      GENERAL COMMENT      No fetal structural abnormality is identified on the Level I survey today  Evaluation of biometry reveals suspicion of fetal growth restriction, with   an estimated fetal weight placed at less than the 5th percentile for this   gestational age   The amniotic fluid volume is normal  The fetal umbilical   and middle cerebral artery Doppler studies are normal  The cerebroplacental ratio is normal       Today's ultrasound findings and suggested follow-up were discussed in   detail with [facility]  Fetal Doppler studies will be repeated on a weekly   basis  Interval growth will be reassessed in 2 weeks  Suggested follow-up   fetal surveillance also includes continuation of twice per week NST's,   weekly JUSTYNA's, and daily kick counts for the indication of IUGR  Delivery   is recommended at 44 to 40 weeks gestation, sooner if otherwise clinically   indicated  The placenta should be sent to Pathology for evaluation after   delivery  The face to face time, in addition to time spent discussing ultrasound   results, was approximately 10 minutes, greater than 50% of which was spent   during counseling and coordination of care  DARIEL Jack M D     Maternal-Fetal Medicine   Electronically signed 05/22/17 10:56

## 2018-01-15 VITALS
DIASTOLIC BLOOD PRESSURE: 72 MMHG | BODY MASS INDEX: 21.82 KG/M2 | HEIGHT: 67 IN | SYSTOLIC BLOOD PRESSURE: 112 MMHG | WEIGHT: 139 LBS

## 2018-01-15 NOTE — RESULT NOTES
Verified Results  (1) HCG QUANT [id number] 01:43PM Tashia Blackwell     Test Name Result Flag Reference   HCG QUANTITATIVE 26672 mIU/mL H <=6   Expected Ranges:     Approximate               Approximate HCG  Gestation age          Concentration ( mIU/mL)  _____________          ______________________   [facility]                      HCG values  0 2-1                       5-50  1-2                           2-3                         100-5000  3-4                         500-42860  4-5                         1000-86346  5-6                         34668-003270  6-8                         [id number]  8-12                        [id number]

## 2018-01-16 NOTE — RESULT NOTES
Verified Results  (1) HCG QUANT [id number] 01:47PM Datas[facility]     Test Name Result Flag Reference   HCG QUANTITATIVE 673 mIU/mL H <=6   Expected Ranges:     Approximate               Approximate HCG  Gestation age          Concentration ( mIU/mL)  _____________          ______________________   Kimber Goins                      HCG values  0 2-1                       5-50  1-2                           2-3                         100-5000  3-4                         500-83617  4-5                         1000-00588  5-6                         [id number]  6-8                         [id number]  8-12                        [id number]     (1) TYPE & SCREEN [id number] 01:47PM Dataslide     Test Name Result Flag Reference   ABO GROUPING A     RH FACTOR Positive     ANTIBODY SCREEN Negative       (1) PROGESTERONE [id number] 01:47PM Dataslide     Test Name Result Flag Reference   PROGESTERONE 24 90 ng/mL     PROGESTERONE:     Serum progesterone 5-25 ng/mL should not be the sole determinant in excluding pregnancy  Menstruating Females: Follicular phase 5 674-1 15 ng/mL   Luteal phase 2 25-24 2 ng/mL   Mid-luteal phase 8 76-21 6 ng/mL   Postmenopausal Females <0 200-0 901 ng/mL     Pregnant Females:   First trimester of pregnancy 11 4-41 0 ng/mL   Second trimester of pregnancy 13 8-156 ng/mL   Third trimester of pregnancy 51 4->200 ng/mL       Plan  Positive urine pregnancy test    · (1) HCG QUANT; Status:Active; Requested for:03Oct2016;    · (1) PROGESTERONE; Status:Active; Requested for:03Oct2016;    · (1) TYPE & SCREEN; Status:Active;  Requested for:03Oct2016;   the patient been transfused in the last 3 months? : No  number of units for surgical date : 0  of Surgery : [id number]  the patient pregnant? : Yes

## 2018-01-16 NOTE — PROGRESS NOTES
Chief Complaint  PT presents for a repeat HCG level  Active Problems    1  Birth Control Method - Oral Contraceptives   2  Positive urine pregnancy test (V72 42) (Z32 01)    Current Meds   1  [place] FE 0 8-25 MG-MCG Oral Tablet Chewable; CHEW OR SWALLOW 1 TABLET   DAILY; Therapy: [id number] to (Evaluate:18Oct2013)  Requested for: 04VUB2191; Last   Rx:16Nov2012 Recorded    Allergies    1  No Known Drug Allergies    Plan  Positive urine pregnancy test    · (1) HCG QUANT; Status:Active - Retrospective By Protocol Authorization; Requested  for:11Oct2016;     Signatures   Electronically signed by :  MINISTERIO Diaz ; Oct 11 2016 10:41AM EST                       (Author)

## 2018-01-17 NOTE — PROGRESS NOTES
MAY 30 2017         RE: Sy Álvarez                                  To: Cathy Rodrigues 86   MR#: [id number]                                    [address]   : [address], [address] [place] [address]    ENC: [id number]                             Fax: [phone]   (Exam #: [id number])      The LMP of this 20year old,  G1, P0-0-0-0 patient was AUG 16 2016, her   working EVANGELINA is ESAU 10 2017 and the current gestational age is 37 weeks 3   days by 1st Trimester Sono  A sonographic examination was performed on MAY   30 2017 using real time equipment  The ultrasound examination was   performed using abdominal technique  The patient has a BMI of 24 3  Her   blood pressure today was 122/85  Earliest ultrasound found in her record: 10/19/16  6w4d  EVANGELINA 6/10/17      Problem list:   1  Possible fetal IUGR with normal dopplers versus a fetus that is small   for gestational age  There is a family hx of 6lb babies  Cardiac motion was observed at 154 bpm       INDICATIONS      intrauterine growth restriction      Exam Types      Doppler study   Amniotic Fluid Index      RESULTS      Fetus # 1 of 1   Vertex presentation   Placenta Location = Anterior   No placenta previa   Placenta Grade = II      The NST was reactive with no decelerations  AMNIOTIC FLUID      Q1: 0 6      Q2: 6 6      Q3: 3 8      Q4: 6 0   JUSTYNA Total = 17 0 cm   Amniotic Fluid: Normal      FETAL VESSELS                                     S/D   PI    RI    PSV   AEDV RF                                                    cm/s       Umbilical Artery           2 22  0 85  0 55        No   No       Middle Cerebral Artery     4 45  1 52  0 78      ANATOMY COMMENTS      The umbilical artery dopplers are normal for gestational age  The middle   cerebral artery dopplers are normal for gestational age  There is no   cephalization of flow seen        BIOPHYSICAL PROFILE The Biophysical Profile score was 10/10  Breathin  Movement: 2  Tone: 2  AFV: 2  NST: 2   The NST was reactive with no decelerations  IMPRESSION      [facility] IUP   38 weeks and 3 days by 1st Tri Sono  (EVANGELINA=ESAU 10 2017)   Vertex presentation   Regular fetal heart rate of 154 bpm   Anterior placenta   No placenta previa      GENERAL COMMENT      Her follow up care should include twice weekly NST's and a once weekly   amniotic fluid assessment/dopplers, along with her daily kick counts  She   is set up for a growth scan 2 weeks from her last growth scan  DARIEL Milligan M D     Maternal-Fetal Medicine   Electronically signed 17 14:09

## 2018-01-18 NOTE — MISCELLANEOUS
Message  Contacted pt regarding whether she completed Stepwise part 1 testing  Pt stated she did not complete  Will task ob/gyn regarding above   Active Problems    1  Birth Control Method - Oral Contraceptives   2  Encounter for routine gynecological examination with Papanicolaou smear of cervix   (V72 31,V76 2) (Z01 419)   3  Encounter for supervision of normal first pregnancy in first trimester (V22 0) (Z34 01)   4  Establish gestational age, ultrasound (V28 3) (Z36)   5  Flu vaccine need (V04 81) (Z23)   6  Positive urine pregnancy test (V72 42) (Z32 01)   7  Screening for STD (sexually transmitted disease) (V74 5) (Z11 3)   8  UTI in pregnancy (646 60,599 0) (O23 40)    Current Meds   1  Prenatal TABS; Therapy: (Recorded:03Nov2016) to Recorded    Allergies    1  Novocain SOLN   2  Vicodin TABS    3  No Known Environmental Allergies   4   No Known Food Allergies    Signatures   Electronically signed by : Bobby Granger RN; Jan 6 2017  2:47PM EST                       (Author)

## 2018-01-22 VITALS — BODY MASS INDEX: 23.41 KG/M2 | DIASTOLIC BLOOD PRESSURE: 60 MMHG | WEIGHT: 149.5 LBS | SYSTOLIC BLOOD PRESSURE: 110 MMHG

## 2018-01-22 VITALS — WEIGHT: 149 LBS | DIASTOLIC BLOOD PRESSURE: 58 MMHG | SYSTOLIC BLOOD PRESSURE: 116 MMHG | BODY MASS INDEX: 23.34 KG/M2

## 2018-01-22 VITALS
SYSTOLIC BLOOD PRESSURE: 120 MMHG | BODY MASS INDEX: 23.45 KG/M2 | DIASTOLIC BLOOD PRESSURE: 70 MMHG | HEIGHT: 67 IN | WEIGHT: 149.38 LBS

## 2018-01-22 VITALS
SYSTOLIC BLOOD PRESSURE: 118 MMHG | DIASTOLIC BLOOD PRESSURE: 64 MMHG | WEIGHT: 135.56 LBS | BODY MASS INDEX: 21.23 KG/M2

## 2018-01-22 VITALS
SYSTOLIC BLOOD PRESSURE: 118 MMHG | WEIGHT: 150.38 LBS | DIASTOLIC BLOOD PRESSURE: 80 MMHG | BODY MASS INDEX: 23.55 KG/M2

## 2018-01-22 VITALS
BODY MASS INDEX: 23.53 KG/M2 | SYSTOLIC BLOOD PRESSURE: 100 MMHG | DIASTOLIC BLOOD PRESSURE: 70 MMHG | WEIGHT: 150.25 LBS

## 2018-01-22 VITALS
DIASTOLIC BLOOD PRESSURE: 76 MMHG | BODY MASS INDEX: 23.27 KG/M2 | WEIGHT: 148.56 LBS | SYSTOLIC BLOOD PRESSURE: 110 MMHG

## 2018-01-24 VITALS
DIASTOLIC BLOOD PRESSURE: 78 MMHG | WEIGHT: 132.8 LBS | HEIGHT: 67 IN | BODY MASS INDEX: 20.84 KG/M2 | SYSTOLIC BLOOD PRESSURE: 110 MMHG

## 2018-02-06 ENCOUNTER — TELEPHONE (OUTPATIENT)
Dept: OBGYN CLINIC | Facility: MEDICAL CENTER | Age: 23
End: 2018-02-06

## 2018-02-06 NOTE — TELEPHONE ENCOUNTER
----- Message from Lázaro Sarkar sent at 2/5/2018  2:08 PM EST -----  Regarding: Nexp Insert  Contact: [phone]  Pt called and had an apt with [facility] previously and they discussed getting the nexp inserted  She decided that she would like to get it, can you please pre auth it and call the pt to schedule apt  Thanks!

## 2018-02-06 NOTE — TELEPHONE ENCOUNTER
Called pt's insurance  Ref# I [id number]  Pt is approved under the women's health mandate at 100%  No PA or coinsurance  Pt is aware

## 2018-02-09 ENCOUNTER — PROCEDURE VISIT (OUTPATIENT)
Dept: OBGYN CLINIC | Facility: MEDICAL CENTER | Age: 23
End: 2018-02-09
Payer: COMMERCIAL

## 2018-02-09 VITALS — WEIGHT: 132 LBS | DIASTOLIC BLOOD PRESSURE: 78 MMHG | SYSTOLIC BLOOD PRESSURE: 120 MMHG | BODY MASS INDEX: 20.67 KG/M2

## 2018-02-09 DIAGNOSIS — Z30.430 ENCOUNTER FOR INSERTION OF CONTRACEPTIVE DEVICE: Primary | ICD-10-CM

## 2018-02-09 PROCEDURE — 11981 INSERTION DRUG DLVR IMPLANT: CPT | Performed by: NURSE PRACTITIONER

## 2018-02-09 NOTE — PROGRESS NOTES
CC: Nexplanon placement    HPI: Sheri Joiner is a 20 y o   female presents today for Nexplanon placement  We discussed contraception options at a previous visit, including alternatives such as combination estrogen-progesterone options, Depo Provera, and the IUD  She was given written information about Nexplanon and has decided to proceed with placement today  She understands that Nexplanon is a progesterone only therapy, and that patients often patients have irregular and unpredictable vaginal bleeding or amenorrhea  She understands that other side effects are possible related to systemic progesterone, including but not limited to, headaches, breast tenderness, nausea, and irritability  The placement procedure for Nexplanon was reviewed with the patient in detail including risks of nerve injury, infection, bleeding and injury to other muscles or tendons  She understands that the Nexplanon implant is good for 3 years and needs to be removed at the end of that time  She understands that Implanon is an extremely effective option for contraception, with failure rate of <1%  Fertility will return upon removal of the device  This information is reviewed today and all questions were answered  Informed consent was obtained, both verbally and written  The patient is healthy and has no contraindications to Neplanon use  Urine pregnancy test was performed today and was negative  Procedure: The patient was placed on the examination table, and her non-dominant left arm was placed lateral to her body above her head in position for Implanon placement  The site, approximately 8 cm above the medial epicondyle of the humerus, was identified and marked with a pen  The area was then cleaned with an antiseptic solution with Betadine swabs x 3  A 1% lidocaine was injected just under the skin along the entire track of the planned Neplanon insertion  The Neplanon applicator was removed from the blister pack in a sterile fashion  Countertraction was applied on the skin and the tip of the needle was inserted at approximately a 30 degree angle just below the level of the skin  The device was then advanced and deployed subdermally  The patient's left arm was palpated, and the full-length of the Neplanon device could be palpable  The patient was then allowed to palpate the 4-cm long andrew, and she confirmed its placement  A small amount of bleeding was noted from the insertion site  Pressure was held on this area and hemostasis was achieved  A small bandage was placed and a compression dressing was applied  The patient tolerated the placement of the Neplanon without difficulty  She was given the Neplanon card and the dates to have her Neplanon removed and the time frame for effective contraception with the Implanon  She was given precautions about post-Neplanon placement, including signs of infection or signs of bleeding  The patient was asked to call the office with any questions or concerns after its placement  She will return in 1 month Neplanon check

## 2018-03-07 NOTE — PROGRESS NOTES
Education  [employer] Education 2nd Trimester:   Second Trimester Education provided:   benefits of breastfeeding, importance of exclusive breastfeeding, early initiation of breastfeeding, exclusive breastfeeding for the first 6 months, rooming-in on 24 hour basis and Pregnancy Essentials Reference Guide given         Signatures   Electronically signed by : MINISTERIO Garcia ; Mar  3 2017 11:53AM EST                       (Author)

## 2018-03-07 NOTE — PROGRESS NOTES
Education  [employer] Education 1st Trimester:   First Trimester Education provided: benefits of breastfeeding, importance of exclusive breastfeeding, early initiation of breastfeeding and exclusive breastfeeding for the first 6 months   The patient is planning on breastfeeding  Prenatal education provided by: Rosalio Smith Date: 11/3/16  Signatures   Electronically signed by :  Jasper Davis, ; Nov  3 2016 10:55AM EST                       (Co-author)

## 2018-03-07 NOTE — PROGRESS NOTES
Education  [facility] Education 3rd Trimester: Third Trimester Education provided:   benefits of breastfeeding and importance of early skin-to-skin contact   rooming-in on 24 hour basis       Prenatal education provided by: Keyanna Lemus      Signatures   Electronically signed by : Keyanna Lemus, ; Apr 11 2017  2:03PM EST                       (Author)

## 2018-03-09 ENCOUNTER — OFFICE VISIT (OUTPATIENT)
Dept: OBGYN CLINIC | Facility: MEDICAL CENTER | Age: 23
End: 2018-03-09
Payer: COMMERCIAL

## 2018-03-09 VITALS — BODY MASS INDEX: 20.96 KG/M2 | DIASTOLIC BLOOD PRESSURE: 80 MMHG | SYSTOLIC BLOOD PRESSURE: 118 MMHG | WEIGHT: 133.8 LBS

## 2018-03-09 DIAGNOSIS — Z30.46 ENCOUNTER FOR SURVEILLANCE OF NEXPLANON SUBDERMAL CONTRACEPTIVE: Primary | ICD-10-CM

## 2018-03-09 PROCEDURE — 99213 OFFICE O/P EST LOW 20 MIN: CPT | Performed by: NURSE PRACTITIONER

## 2018-03-09 NOTE — PROGRESS NOTES
Assessment Shakeel Bingham was seen today for follow-up  Diagnoses and all orders for this visit:    Encounter for surveillance of Nexplanon subdermal contraceptive         Plan  Patient is doing well  Pt to f/u in 9 months for annual exam   Reviewed side effect profile of nexplanon with pt  Understands that any bleeding at anytime can be normal w this method  Will call with any questions/concerns  Subjective   Vandana Cole is a 20 y o  female here for a postop visit  Patient is has no complaints today  Patient had a nexplanon inserted  on 2/9/18  Pt c/o heavy bleeding for one week after insert, but no bleeding since  Has no c/o at the site of insertion, and feels good on it  Patient Active Problem List   Diagnosis   (none) - all problems resolved or deleted       Gynecologic History  LMP was 2/9/18  The current method of family planning is Nexplanon  Past Medical History:   Diagnosis Date    Anxiety     Asthma     Complication of anesthesia     Group beta Strep positive     Hemorrhoids during pregnancy     Urinary tract infection affecting pregnancy     Varicella     Visual impairment      Past Surgical History:   Procedure Laterality Date    ELBOW SURGERY      WISDOM TOOTH EXTRACTION      WRIST SURGERY       Family History   Problem Relation Age of Onset    No Known Problems Mother     No Known Problems Father      Social History     Social History    Marital status: /Civil Union     Spouse name: N/A    Number of children: N/A    Years of education: N/A     Occupational History    Not on file       Social History Main Topics    Smoking status: Never Smoker    Smokeless tobacco: Never Used    Alcohol use No    Drug use: No    Sexual activity: Yes     Partners: Male     Other Topics Concern    Not on file     Social History Narrative    No narrative on file     Allergies   Allergen Reactions    Procaine     Vicodin [Hydrocodone-Acetaminophen] GI Intolerance Current Outpatient Prescriptions:     albuterol (PROVENTIL HFA,VENTOLIN HFA) 90 mcg/act inhaler, Inhale 2 puffs every 6 (six) hours as needed for wheezing, Disp: , Rfl:     diphenhydrAMINE (BENADRYL) 25 mg tablet, Take 25 mg by mouth as needed for itching, Disp: , Rfl:     docusate sodium (COLACE) 100 mg capsule, Take 1 capsule by mouth 2 (two) times a day, Disp: 10 capsule, Rfl: 0    ibuprofen (MOTRIN) 600 mg tablet, Take 1 tablet by mouth every 6 (six) hours as needed for mild pain, Disp: 30 tablet, Rfl: 0    Prenatal MV-Min-Fe Fum-FA-DHA (PRENATAL 1 PO), Take 1 tablet by mouth daily, Disp: , Rfl:     Review of Systems  Constitutional :no fever, feels well, no tiredness, no recent weight gain or loss  ENT: no ear ache, no loss of hearing, no nosebleeds or nasal discharge, no sore throat or hoarseness  Cardiovascular: no complaints of slow or fast heart beat, no chest pain, no palpitations, no leg claudication or lower extremity edema  Respiratory: no complaints of shortness of shortness of breath, no CORTEZ  Breasts:no complaints of breast pain, breast lump, or nipple discharge  Gastrointestinal: no complaints of abdominal pain, constipation,nausea, vomiting, or diarrhea or bloody stools  Genitourinary : no complaints of dysuria, incontinence, pelvic pain, no dysmenorrhea, vaginal discharge or abnormal vaginal bleeding and as noted in HPI    Integumentary: no complaints of skin rash or lesion, itching or dry skin  Neurological: no complaints of headache, no confusion, no numbness or tingling, no dizziness or fainting     Objective     /80   Wt 60 7 kg (133 lb 12 8 oz)   BMI 20 96 kg/m²     General:   appears stated age, cooperative, alert normal mood and affect

## 2018-06-07 ENCOUNTER — HOSPITAL ENCOUNTER (EMERGENCY)
Facility: HOSPITAL | Age: 23
Discharge: HOME | End: 2018-06-07
Attending: EMERGENCY MEDICINE | Admitting: EMERGENCY MEDICINE
Payer: COMMERCIAL

## 2018-06-07 VITALS
RESPIRATION RATE: 18 BRPM | BODY MASS INDEX: 21.14 KG/M2 | DIASTOLIC BLOOD PRESSURE: 67 MMHG | SYSTOLIC BLOOD PRESSURE: 112 MMHG | HEART RATE: 94 BPM | OXYGEN SATURATION: 99 % | WEIGHT: 135 LBS | TEMPERATURE: 98.2 F

## 2018-06-07 DIAGNOSIS — L03.115 CELLULITIS OF RIGHT LOWER LEG: ICD-10-CM

## 2018-06-07 DIAGNOSIS — R00.2 PALPITATIONS: Primary | ICD-10-CM

## 2018-06-07 LAB
ALBUMIN SERPL BCP-MCNC: 4 G/DL (ref 3.5–5)
ALP SERPL-CCNC: 52 U/L (ref 46–116)
ALT SERPL W P-5'-P-CCNC: 19 U/L (ref 12–78)
ANION GAP SERPL CALCULATED.3IONS-SCNC: 7 MMOL/L (ref 4–13)
AST SERPL W P-5'-P-CCNC: 14 U/L (ref 5–45)
BASOPHILS # BLD AUTO: 0.01 THOUSANDS/ΜL (ref 0–0.1)
BASOPHILS NFR BLD AUTO: 0 % (ref 0–1)
BILIRUB SERPL-MCNC: 0.51 MG/DL (ref 0.2–1)
BUN SERPL-MCNC: 16 MG/DL (ref 5–25)
CALCIUM SERPL-MCNC: 8.7 MG/DL (ref 8.3–10.1)
CHLORIDE SERPL-SCNC: 104 MMOL/L (ref 100–108)
CO2 SERPL-SCNC: 29 MMOL/L (ref 21–32)
CREAT SERPL-MCNC: 0.65 MG/DL (ref 0.6–1.3)
DEPRECATED D DIMER PPP: <270 NG/ML (FEU) (ref 0–424)
EOSINOPHIL # BLD AUTO: 0.1 THOUSAND/ΜL (ref 0–0.61)
EOSINOPHIL NFR BLD AUTO: 2 % (ref 0–6)
ERYTHROCYTE [DISTWIDTH] IN BLOOD BY AUTOMATED COUNT: 13.2 % (ref 11.6–15.1)
GFR SERPL CREATININE-BSD FRML MDRD: 125 ML/MIN/1.73SQ M
GLUCOSE SERPL-MCNC: 96 MG/DL (ref 65–140)
HCT VFR BLD AUTO: 42.2 % (ref 34.8–46.1)
HGB BLD-MCNC: 14.1 G/DL (ref 11.5–15.4)
LYMPHOCYTES # BLD AUTO: 0.6 THOUSANDS/ΜL (ref 0.6–4.47)
LYMPHOCYTES NFR BLD AUTO: 11 % (ref 14–44)
MCH RBC QN AUTO: 30.6 PG (ref 26.8–34.3)
MCHC RBC AUTO-ENTMCNC: 33.4 G/DL (ref 31.4–37.4)
MCV RBC AUTO: 92 FL (ref 82–98)
MONOCYTES # BLD AUTO: 0.57 THOUSAND/ΜL (ref 0.17–1.22)
MONOCYTES NFR BLD AUTO: 10 % (ref 4–12)
NEUTROPHILS # BLD AUTO: 4.43 THOUSANDS/ΜL (ref 1.85–7.62)
NEUTS SEG NFR BLD AUTO: 78 % (ref 43–75)
NRBC BLD AUTO-RTO: 0 /100 WBCS
PLATELET # BLD AUTO: 143 THOUSANDS/UL (ref 149–390)
PMV BLD AUTO: 10.5 FL (ref 8.9–12.7)
POTASSIUM SERPL-SCNC: 3.6 MMOL/L (ref 3.5–5.3)
PROT SERPL-MCNC: 7.5 G/DL (ref 6.4–8.2)
RBC # BLD AUTO: 4.61 MILLION/UL (ref 3.81–5.12)
SODIUM SERPL-SCNC: 140 MMOL/L (ref 136–145)
TSH SERPL DL<=0.05 MIU/L-ACNC: 1.12 UIU/ML (ref 0.36–3.74)
WBC # BLD AUTO: 5.71 THOUSAND/UL (ref 4.31–10.16)

## 2018-06-07 PROCEDURE — 99285 EMERGENCY DEPT VISIT HI MDM: CPT

## 2018-06-07 PROCEDURE — 93005 ELECTROCARDIOGRAM TRACING: CPT

## 2018-06-07 PROCEDURE — 80053 COMPREHEN METABOLIC PANEL: CPT | Performed by: EMERGENCY MEDICINE

## 2018-06-07 PROCEDURE — 85379 FIBRIN DEGRADATION QUANT: CPT | Performed by: EMERGENCY MEDICINE

## 2018-06-07 PROCEDURE — 36415 COLL VENOUS BLD VENIPUNCTURE: CPT | Performed by: EMERGENCY MEDICINE

## 2018-06-07 PROCEDURE — 85025 COMPLETE CBC W/AUTO DIFF WBC: CPT | Performed by: EMERGENCY MEDICINE

## 2018-06-07 PROCEDURE — 84443 ASSAY THYROID STIM HORMONE: CPT | Performed by: EMERGENCY MEDICINE

## 2018-06-07 RX ORDER — CEPHALEXIN 500 MG/1
500 CAPSULE ORAL EVERY 12 HOURS SCHEDULED
Qty: 14 CAPSULE | Refills: 0 | Status: SHIPPED | OUTPATIENT
Start: 2018-06-07 | End: 2018-06-14

## 2018-06-08 LAB
ATRIAL RATE: 96 BPM
P AXIS: 52 DEGREES
PR INTERVAL: 142 MS
QRS AXIS: 98 DEGREES
QRSD INTERVAL: 82 MS
QT INTERVAL: 344 MS
QTC INTERVAL: 434 MS
T WAVE AXIS: 46 DEGREES
VENTRICULAR RATE: 96 BPM

## 2018-06-08 PROCEDURE — 93010 ELECTROCARDIOGRAM REPORT: CPT | Performed by: INTERNAL MEDICINE

## 2018-06-08 NOTE — DISCHARGE INSTRUCTIONS
Cellulitis   WHAT YOU NEED TO KNOW:   Cellulitis is a skin infection caused by bacteria  Cellulitis may go away on its own or you may need treatment  Your healthcare provider may draw a [place] around the outside edges of your cellulitis  If your cellulitis spreads, your healthcare provider will see it outside of the [place]  DISCHARGE INSTRUCTIONS:   Call 911 if:   · You have sudden trouble breathing or chest pain  Return to the emergency department if:   · Your wound gets larger and more painful  · You feel a crackling under your skin when you touch it  · You have purple dots or bumps on your skin, or you see bleeding under your skin  · You have new swelling and pain in your legs  · The red, warm, swollen area gets larger  · You see red streaks coming from the infected area  Contact your healthcare provider if:   · You have a fever  · Your fever or pain does not go away or gets worse  · The area does not get smaller after 2 days of antibiotics  · Your skin is flaking or peeling off  · You have questions or concerns about your condition or care  Medicines:   · Antibiotics  help treat the bacterial infection  · NSAIDs , such as ibuprofen, help decrease swelling, pain, and fever  NSAIDs can cause stomach bleeding or kidney problems in certain people  If you take blood thinner medicine, always ask if NSAIDs are safe for you  Always read the medicine label and follow directions  Do not give these medicines to children under 10months of age without direction from your child's healthcare provider  · Acetaminophen  decreases pain and fever  It is available without a doctor's order  Ask how much to take and how often to take it  Follow directions  Read the labels of all other medicines you are using to see if they also contain acetaminophen, or ask your doctor or pharmacist  Acetaminophen can cause liver damage if not taken correctly   Do not use more than 4 grams (4,000 milligrams) total of acetaminophen in one day  · Take your medicine as directed  Contact your healthcare provider if you think your medicine is not helping or if you have side effects  Tell him or her if you are allergic to any medicine  Keep a list of the medicines, vitamins, and herbs you take  Include the amounts, and when and why you take them  Bring the list or the pill bottles to follow-up visits  Carry your medicine list with you in case of an emergency  Self-care:   · Elevate the area above the level of your heart  as often as you can  This will help decrease swelling and pain  Prop the area on pillows or blankets to keep it elevated comfortably  · Clean the area daily until the wound scabs over  Gently wash the area with soap and water  Pat dry  Use dressings as directed  · Place cool or warm, wet cloths on the area as directed  Use clean cloths and clean water  Leave it on the area until the cloth is room temperature  Pat the area dry with a clean, dry cloth  The cloths may help decrease pain  Prevent cellulitis:   · Do not scratch bug bites or areas of injury  You increase your risk for cellulitis by scratching these areas  · Do not share personal items, such as towels, clothing, and razors  · Clean exercise equipment  with germ-killing  before and after you use it  · Wash your hands often  Use soap and water  Wash your hands after you use the bathroom, change a child's diapers, or sneeze  Wash your hands before you prepare or eat food  Use lotion to prevent dry, cracked skin  · Wear pressure stockings as directed  You may be told to wear the stockings if you have peripheral edema  The stockings improve blood flow and decrease swelling  · Treat athlete's foot  This can help prevent the spread of a bacterial skin infection  Follow up with your healthcare provider within 3 days, or as directed:   Your healthcare provider will check if your cellulitis is getting better  You may need different medicine  Write down your questions so you remember to ask them during your visits  © 2017 [employer] Information is for End User's use only and may not be sold, redistributed or otherwise used for commercial purposes  All illustrations and images included in CareNotes® are the copyrighted property of A D A M , Inc  or [employer]ul[employer] Grimes  The above information is an  only  It is not intended as medical advice for individual conditions or treatments  Talk to your doctor, nurse or pharmacist before following any medical regimen to see if it is safe and effective for you  Heart Palpitations   WHAT YOU NEED TO KNOW:   Heart palpitations are feelings that your heart races, jumps, throbs, or flutters  You may feel extra beats, no beats for a short time, or skipped beats  You may have these feelings in your chest, throat, or neck  They may happen when you are sitting, standing, or lying  Heart palpitations may be frightening, but are usually not caused by a serious problem  DISCHARGE INSTRUCTIONS:   Call 911 or have someone else call for any of the following:   · You have any of the following signs of a heart attack:      ¨ Squeezing, pressure, or pain in your chest that lasts longer than 5 minutes or returns    ¨ Discomfort or pain in your back, neck, jaw, stomach, or arm     ¨ Trouble breathing    ¨ Nausea or vomiting    ¨ Lightheadedness or a sudden cold sweat, especially with chest pain or trouble breathing    · You have any of the following signs of a stroke:      ¨ Numbness or drooping on one side of your face     ¨ Weakness in an arm or leg    ¨ Confusion or difficulty speaking    ¨ Dizziness, a severe headache, or vision loss    · You faint or lose consciousness  Return to the emergency department if:   · Your palpitations happen more often or get more intense       Contact your healthcare provider if:   · You have new or worsening swelling in your feet or ankles  · You have questions or concerns about your condition or care  Follow up with your healthcare provider as directed: You may need to follow up with a cardiologist  Ashley Olivo may need tests to check for heart problems that cause palpitations  Write down your questions so you remember to ask them during your visits  Keep a record:  Write down when your palpitations start and stop, what you were doing when they started, and your symptoms  Keep track of what you ate or drank within a few hours of your palpitations  Include anything that seemed to help your symptoms, such as lying down or holding your breath  This record will help you and your healthcare provider learn what triggers your palpitations  Bring this record with you to your follow up visits  Help prevent heart palpitations:   · Manage stress and anxiety  Find ways to relax such as listening to music, meditating, or doing yoga  Exercise can also help decrease stress and anxiety  Talk to someone you trust about your stress or anxiety  You can also talk to a therapist      · Get plenty of sleep every night  Ask your healthcare provider how much sleep you need each night  · Do not drink caffeine or alcohol  Caffeine and alcohol can make your palpitations worse  Caffeine is found in soda, coffee, tea, chocolate, and drinks that increase your energy  · Do not smoke  Nicotine and other chemicals in cigarettes and cigars may damage your heart and blood vessels  Ask your healthcare provider for information if you currently smoke and need help to quit  E-cigarettes or smokeless tobacco still contain nicotine  Talk to your healthcare provider before you use these products  · Do not use illegal drugs  Talk to your healthcare provider if you use illegal drugs and want help to quit    © 2017 [employer] Information is for End User's use only and may not be sold, redistributed or otherwise used for commercial purposes  All illustrations and images included in CareNotes® are the copyrighted property of A D A M , Inc  or [employer] Gr[employer]  The above information is an  only  It is not intended as medical advice for individual conditions or treatments  Talk to your doctor, nurse or pharmacist before following any medical regimen to see if it is safe and effective for you

## 2018-06-08 NOTE — ED PROVIDER NOTES
History  Chief Complaint   Patient presents with    Palpitations     Reports feeling palpitations after waking up from sleeping  Denies CP, but reports that she had pain in her upper abdomen and ribs  Reports feeling weak recently  69-year-old female presents to the emergency department complaining of palpitations since earlier today  She felt like her heart was racing  She also had a brief episode of pain in the epigastric to right lower chest region  She states that that pain is better  No shortness of breath  She has had nausea without vomiting  She denies fevers or chills  No recent stressful event  No excessive caffeine use  No recent travel  She does have an implantable birth control and her left arm  She has not noticed any leg swelling  She does also complain of a painful area to the lateral aspect of her right lower leg  She is uncertain if she was bit by something or if it was an ingrown hair she states it is starting to increase in redness  History provided by:  Patient   used: No    Rapid Heart Rate   Palpitations quality:  Fast  Onset quality:   At rest  Duration:  4 hours  Timing:  Constant  Progression:  Unchanged  Chronicity:  New  Context: not anxiety, not appetite suppressants, not blood loss, not caffeine, not dehydration, not exercise, not hyperventilation, not illicit drugs, not nicotine and not stimulant use    Relieved by:  None tried  Worsened by:  Nothing  Ineffective treatments:  None tried  Associated symptoms: nausea    Associated symptoms: no back pain, no chest pain, no chest pressure, no cough, no diaphoresis, no dizziness, no hemoptysis, no leg pain, no lower extremity edema, no malaise/fatigue, no near-syncope, no numbness, no orthopnea, no PND, no shortness of breath, no syncope, no vomiting and no weakness    Risk factors: no diabetes mellitus, no heart disease, no hx of atrial fibrillation, no hx of DVT, no hx of PE, no hx of thyroid disease, no hypercoagulable state, no hyperthyroidism, no OTC sinus medications and no stress        Prior to Admission Medications   Prescriptions Last Dose Informant Patient Reported? Taking? Prenatal MV-Min-Fe Fum-FA-DHA (PRENATAL 1 PO)   Yes No   Sig: Take 1 tablet by mouth daily   albuterol (PROVENTIL HFA,VENTOLIN HFA) 90 mcg/act inhaler   Yes No   Sig: Inhale 2 puffs every 6 (six) hours as needed for wheezing   diphenhydrAMINE (BENADRYL) 25 mg tablet   Yes No   Sig: Take 25 mg by mouth as needed for itching   docusate sodium (COLACE) 100 mg capsule   No No   Sig: Take 1 capsule by mouth 2 (two) times a day   ibuprofen (MOTRIN) 600 mg tablet   No No   Sig: Take 1 tablet by mouth every 6 (six) hours as needed for mild pain      Facility-Administered Medications: None       Past Medical History:   Diagnosis Date    Anxiety     Asthma     Complication of anesthesia     Group beta Strep positive     Hemorrhoids during pregnancy     Urinary tract infection affecting pregnancy     Varicella     Visual impairment        Past Surgical History:   Procedure Laterality Date    ELBOW SURGERY      WISDOM TOOTH EXTRACTION      WRIST SURGERY         Family History   Problem Relation Age of Onset    No Known Problems Mother     No Known Problems Father      I have reviewed and agree with the history as documented  Social History   Substance Use Topics    Smoking status: Never Smoker    Smokeless tobacco: Never Used    Alcohol use No        Review of Systems   Constitutional: Negative  Negative for chills, diaphoresis, fatigue, fever and malaise/fatigue  HENT: Negative  Negative for congestion, rhinorrhea and sore throat  Eyes: Negative  Negative for discharge, redness and itching  Respiratory: Negative  Negative for apnea, cough, hemoptysis, chest tightness, shortness of breath and wheezing  Cardiovascular: Positive for palpitations   Negative for chest pain, orthopnea, leg swelling, syncope, PND and near-syncope  Gastrointestinal: Positive for nausea  Negative for abdominal pain and vomiting  Endocrine: Negative  Genitourinary: Negative  Negative for flank pain, frequency and urgency  Musculoskeletal: Negative  Negative for back pain  Skin: Negative  Allergic/Immunologic: Negative  Neurological: Negative  Negative for dizziness, syncope, weakness, light-headedness, numbness and headaches  Hematological: Negative  All other systems reviewed and are negative  Physical Exam  Physical Exam   Constitutional: She is oriented to person, place, and time  She appears well-developed and well-nourished  Non-toxic appearance  She does not have a sickly appearance  She does not appear ill  No distress  HENT:   Head: Normocephalic and atraumatic  Right Ear: External ear normal    Left Ear: External ear normal    Nose: Nose normal    Mouth/Throat: Oropharynx is clear and moist    Eyes: Conjunctivae are normal  Pupils are equal, round, and reactive to light  Right eye exhibits no discharge  Left eye exhibits no discharge  No scleral icterus  Cardiovascular: Regular rhythm and normal heart sounds  Tachycardia present  Exam reveals no gallop and no friction rub  No murmur heard  Pulmonary/Chest: Effort normal and breath sounds normal  No respiratory distress  She has no wheezes  She has no rales  She exhibits no tenderness  Abdominal: Soft  Bowel sounds are normal  She exhibits no distension and no mass  There is no tenderness  No hernia  Musculoskeletal: Normal range of motion  She exhibits no edema, tenderness or deformity  Neurological: She is alert and oriented to person, place, and time  She has normal reflexes  She exhibits normal muscle tone  Skin: Skin is warm and dry  No rash noted  She is not diaphoretic  No erythema  No pallor  Patient has a 1 centimeter area of erythema to the lateral aspect of the right lower leg below the knee    No fluctuance or induration  Psychiatric: She has a normal mood and affect  Nursing note and vitals reviewed  Vital Signs  ED Triage Vitals [06/07/18 1831]   Temperature Pulse Respirations Blood Pressure SpO2   98 2 °F (36 8 °C) (!) 111 16 134/82 100 %      Temp Source Heart Rate Source Patient Position - Orthostatic VS BP Location FiO2 (%)   Temporal Monitor Sitting Right arm --      Pain Score       3           Vitals:    06/07/18 1831 06/07/18 1930 06/07/18 2030   BP: 134/82 124/66 120/73   Pulse: (!) 111 98 96   Patient Position - Orthostatic VS: Sitting  Lying       Visual Acuity      ED Medications  Medications - No data to display    Diagnostic Studies  Results Reviewed     Procedure Component Value Units Date/Time    D-Dimer [[id number]]  (Normal) Collected:  06/07/18 2024    Lab Status:  Final result Specimen:  Blood from Arm, Right Updated:  06/07/18 2106     D-Dimer, Quant <270 ng/ml (FEU)     TSH [[id number]]  (Normal) Collected:  06/07/18 2024    Lab Status:  Final result Specimen:  Blood from Arm, Right Updated:  06/07/18 2059     TSH 3RD GENERATON 1 118 uIU/mL     Narrative:         Patients undergoing fluorescein dye angiography may retain small amounts of fluorescein in the body for 48-72 hours post procedure  Samples containing fluorescein can produce falsely depressed TSH values  If the patient had this procedure,a specimen should be resubmitted post fluorescein clearance            The recommended reference ranges for TSH during pregnancy are as follows:  First trimester 0 1 to 2 5 uIU/mL  Second trimester  0 2 to 3 0 uIU/mL  Third trimester 0 3 to 3 0 uIU/m      Comprehensive metabolic panel [[id number]] Collected:  06/07/18 2024    Lab Status:  Final result Specimen:  Blood from Arm, Right Updated:  06/07/18 2056     Sodium 140 mmol/L      Potassium 3 6 mmol/L      Chloride 104 mmol/L      CO2 29 mmol/L      Anion Gap 7 mmol/L      BUN 16 mg/dL      Creatinine 0 65 mg/dL      Glucose 96 mg/dL      Calcium 8 7 mg/dL      AST 14 U/L      ALT 19 U/L      Alkaline Phosphatase 52 U/L      Total Protein 7 5 g/dL      Albumin 4 0 g/dL      Total Bilirubin 0 51 mg/dL      eGFR 125 ml/min/1 73sq m     Narrative:         National Kidney Disease Education Program recommendations are as follows:  GFR calculation is accurate only with a steady state creatinine  Chronic Kidney disease less than 60 ml/min/1 73 sq  meters  Kidney failure less than 15 ml/min/1 73 sq  meters      CBC and differential [[id number]]  (Abnormal) Collected:  06/07/18 2024    Lab Status:  Final result Specimen:  Blood from Arm, Right Updated:  06/07/18 2036     WBC 5 71 Thousand/uL      RBC 4 61 Million/uL      Hemoglobin 14 1 g/dL      Hematocrit 42 2 %      MCV 92 fL      MCH 30 6 pg      MCHC 33 4 g/dL      RDW 13 2 %      MPV 10 5 fL      Platelets 286 (L) Thousands/uL      nRBC 0 /100 WBCs      Neutrophils Relative 78 (H) %      Lymphocytes Relative 11 (L) %      Monocytes Relative 10 %      Eosinophils Relative 2 %      Basophils Relative 0 %      Neutrophils Absolute 4 43 Thousands/µL      Lymphocytes Absolute 0 60 Thousands/µL      Monocytes Absolute 0 57 Thousand/µL      Eosinophils Absolute 0 10 Thousand/µL      Basophils Absolute 0 01 Thousands/µL                  No orders to display              Procedures  Procedures       Phone Contacts  ED Phone Contact    ED Course               PERC Rule for PE      Most Recent Value   PERC Rule for PE   Age >=50  0 Filed at: 06/07/2018 2027   HR >=100  0 Filed at: 06/07/2018 2027   O2 Sat on room air < 95%  0 Filed at: 06/07/2018 2027   History of PE or DVT  0 Filed at: 06/07/2018 2027   Recent trauma or surgery  0 Filed at: 06/07/2018 2027   Hemoptysis  0 Filed at: 06/07/2018 2027   Exogenous estrogen  1 Filed at: 06/07/2018 2027   Unilateral leg swelling  0 Filed at: 06/07/2018 2027   PERC Rule for PE Results  1 Filed at: 06/07/2018 2027                Wells' Criteria for PE      Most Recent Value   Koko' Criteria for PE   Clinical signs and symptoms of DVT  0 Filed at: 06/07/2018 2028   PE is primary diagnosis or equally likely  0 Filed at: 06/07/2018 2028   HR >100  1 5 Filed at: 06/07/2018 2028   Immobilization at least 3 days or Surgery in the previous 4 weeks  0 Filed at: 06/07/2018 2028   Previous, objectively diagnosed PE or DVT  0 Filed at: 06/07/2018 2028   Hemoptysis  0 Filed at: 06/07/2018 2028   Malignancy with treatment within 6 months or palliative  0 Filed at: 06/07/2018 2028   Wells' Criteria Total  1 5 Filed at: 06/07/2018 2028            [facility]  Number of Diagnoses or Management Options  Diagnosis management comments: 67-year-old female presents with palpitations since this afternoon  She feels like her heart is racing  She also had a brief episode of epigastric discomfort  On exam she is awake and alert and in no distress  Her heart rate fluctuates from the 90s to low 100s  She is low risk for PE but does have some risk factors including family history and exogenous estrogen  Will add D-dimer check electrolytes and TSH  Amount and/or Complexity of Data Reviewed  Clinical lab tests: ordered and reviewed  Independent visualization of images, tracings, or specimens: yes      CritCare Time    Disposition  Final diagnoses:   Palpitations   Cellulitis of right lower leg     Time reflects when diagnosis was documented in both MDM as applicable and the Disposition within this note     Time User Action Codes Description Comment    6/7/2018  9:28 PM Steve Cruz Add [R00 2] Palpitations     6/7/2018  9:28 PM Steve Cruz Add [Z20 346] Cellulitis of right lower leg       ED Disposition     ED Disposition Condition Comment    Discharge  [facility] discharge to home/self care      Condition at discharge: Good        Follow-up Information     Follow up With Specialties Details Why Contact Info    Maira Baez MD Family Medicine Schedule an appointment as soon as possible for a visit in 3 days  [address]            Patient's Medications   Discharge Prescriptions    CEPHALEXIN (KEFLEX) 500 MG CAPSULE    Take 1 capsule (500 mg total) by mouth every 12 (twelve) hours for 7 days       Start Date: 6/7/2018  End Date: 6/14/2018       Order Dose: 500 mg       Quantity: 14 capsule    Refills: 0     No discharge procedures on file      ED Provider  Electronically Signed by           Guillermo Schneider DO  06/07/18 3478

## 2018-06-08 NOTE — ED PROCEDURE NOTE
PROCEDURE  ECG 12 Lead Documentation  Date/Time: 6/7/2018 8:26 PM  Performed by: Frieda Jhaveri  Authorized by: Frieda Jhaveri     Indications / Diagnosis:  Palpitations  ECG reviewed by me, the ED Provider: yes    Interpretation:     Interpretation: normal    Rate:     ECG rate:  96    ECG rate assessment: normal    Rhythm:     Rhythm: sinus rhythm    Ectopy:     Ectopy: none    QRS:     QRS axis:  Normal  Conduction:     Conduction: normal    ST segments:     ST segments:  Normal  T waves:     T waves: normal           Bert Bertrand DO  06/07/18 2027

## 2018-06-24 ENCOUNTER — OFFICE VISIT (OUTPATIENT)
Dept: URGENT CARE | Facility: CLINIC | Age: 23
End: 2018-06-24
Payer: COMMERCIAL

## 2018-06-24 VITALS
HEIGHT: 67 IN | HEART RATE: 79 BPM | WEIGHT: 130 LBS | RESPIRATION RATE: 18 BRPM | TEMPERATURE: 97.7 F | DIASTOLIC BLOOD PRESSURE: 87 MMHG | BODY MASS INDEX: 20.4 KG/M2 | OXYGEN SATURATION: 100 % | SYSTOLIC BLOOD PRESSURE: 129 MMHG

## 2018-06-24 DIAGNOSIS — L02.415 ABSCESS OF RIGHT LOWER LEG: Primary | ICD-10-CM

## 2018-06-24 PROCEDURE — 10060 I&D ABSCESS SIMPLE/SINGLE: CPT | Performed by: EMERGENCY MEDICINE

## 2018-06-24 PROCEDURE — 87205 SMEAR GRAM STAIN: CPT | Performed by: EMERGENCY MEDICINE

## 2018-06-24 PROCEDURE — 87186 SC STD MICRODIL/AGAR DIL: CPT | Performed by: EMERGENCY MEDICINE

## 2018-06-24 PROCEDURE — 99203 OFFICE O/P NEW LOW 30 MIN: CPT | Performed by: EMERGENCY MEDICINE

## 2018-06-24 PROCEDURE — 87147 CULTURE TYPE IMMUNOLOGIC: CPT | Performed by: EMERGENCY MEDICINE

## 2018-06-24 PROCEDURE — 87070 CULTURE OTHR SPECIMN AEROBIC: CPT | Performed by: EMERGENCY MEDICINE

## 2018-06-24 RX ORDER — SULFAMETHOXAZOLE AND TRIMETHOPRIM 800; 160 MG/1; MG/1
1 TABLET ORAL EVERY 12 HOURS SCHEDULED
Qty: 14 TABLET | Refills: 0 | Status: SHIPPED | OUTPATIENT
Start: 2018-06-24 | End: 2018-07-01

## 2018-06-24 NOTE — PATIENT INSTRUCTIONS
An and a it that he still may be having a burning rales back Abscess Incision and Drainage   WHAT YOU NEED TO KNOW:   What do I need to know about an abscess incision and drainage? An abscess incision and drainage (I and D) is a procedure to drain pus from an abscess and clean it out so it can heal   How do I prepare for an I and D? Your healthcare provider will talk to you about how to prepare for an I and D  He will tell you what medicines to take or not take on the day of your I and D  What will happen during an I and D? You will be given local or regional anesthesia to numb the area and keep you free from pain  Your healthcare provider will make an incision in your skin near or over the abscess  He will press on the area to drain the pus  A cotton swab or other medical tool wrapped in gauze may be used to clean the inside of the abscess  Your healthcare provider may wash the wound with saline (salt water)  He may place plain gauze or gauze with medicine in your wound to help it heal  A dry bandage will be placed over your wound  You may be given antibiotics to treat the infection  What are the risks of an I and D? A scar may form on your skin as it heals  Your incision may heal slowly, feel painful, or get infected  Your abscess may come back, even after treatment  You may need another I and D if the abscess comes back  The bacteria may spread to your heart or other organs  This can be life-threatening  CARE AGREEMENT:   You have the right to help plan your care  Learn about your health condition and how it may be treated  Discuss treatment options with your caregivers to decide what care you want to receive  You always have the right to refuse treatment  The above information is an  only  It is not intended as medical advice for individual conditions or treatments  Talk to your doctor, nurse or pharmacist before following any medical regimen to see if it is safe and effective for you    © 2017 [facility] Information is for End User's use only and may not be sold, redistributed or otherwise used for commercial purposes  All illustrations and images included in CareNotes® are the copyrighted property of A D A M , Inc  or [place] [address]

## 2018-06-24 NOTE — PROGRESS NOTES
[facility]        NAME: Evin Gr is a 24 y o  female  : 1995    MRN: [id number]  DATE: 2018  TIME: 4:59 PM    Assessment and Plan   Abscess of right lower leg [L02 415]  1  Abscess of right lower leg  Wound culture and Gram stain    sulfamethoxazole-trimethoprim (BACTRIM DS) 800-160 mg per tablet         Patient Instructions     Patient Instructions   An and a it that he still may be having a burning rales back Abscess Incision and Drainage   WHAT YOU NEED TO KNOW:   What do I need to know about an abscess incision and drainage? An abscess incision and drainage (I and D) is a procedure to drain pus from an abscess and clean it out so it can heal   How do I prepare for an I and D? Your healthcare provider will talk to you about how to prepare for an I and D  He will tell you what medicines to take or not take on the day of your I and D  What will happen during an I and D? You will be given local or regional anesthesia to numb the area and keep you free from pain  Your healthcare provider will make an incision in your skin near or over the abscess  He will press on the area to drain the pus  A cotton swab or other medical tool wrapped in gauze may be used to clean the inside of the abscess  Your healthcare provider may wash the wound with saline (salt water)  He may place plain gauze or gauze with medicine in your wound to help it heal  A dry bandage will be placed over your wound  You may be given antibiotics to treat the infection  What are the risks of an I and D? A scar may form on your skin as it heals  Your incision may heal slowly, feel painful, or get infected  Your abscess may come back, even after treatment  You may need another I and D if the abscess comes back  The bacteria may spread to your heart or other organs  This can be life-threatening  CARE AGREEMENT:   You have the right to help plan your care   Learn about your health condition and how it may be treated  Discuss treatment options with your caregivers to decide what care you want to receive  You always have the right to refuse treatment  The above information is an  only  It is not intended as medical advice for individual conditions or treatments  Talk to your doctor, nurse or pharmacist before following any medical regimen to see if it is safe and effective for you  © 2017 [address] [employer] Information is for End User's use only and may not be sold, redistributed or otherwise used for commercial purposes  All illustrations and images included in CareNotes® are the copyrighted property of [employer] M [employer]  or [place] [place]  Follow up with PCP in 3-5 days  Proceed to  ER if symptoms worsen  Chief Complaint     Chief Complaint   Patient presents with    Abrasion     Right lower leg 3 weeks where Er          History of Present Illness       Patient has increasing redness and swelling at the site of a 1week-old abrasion on her right lateral lower leg  She denies        Review of Systems   Review of Systems   Constitutional: Negative for activity change  Gastrointestinal: Negative for abdominal pain  Musculoskeletal: Negative for arthralgias, back pain, joint swelling, myalgias, neck pain and neck stiffness  Skin: Positive for color change and wound  Neurological: Negative for dizziness, syncope, weakness, light-headedness and headaches           Current Medications       Current Outpatient Prescriptions:     albuterol (PROVENTIL HFA,VENTOLIN HFA) 90 mcg/act inhaler, Inhale 2 puffs every 6 (six) hours as needed for wheezing, Disp: , Rfl:     diphenhydrAMINE (BENADRYL) 25 mg tablet, Take 25 mg by mouth as needed for itching, Disp: , Rfl:     Prenatal MV-Min-Fe Fum-FA-DHA (PRENATAL 1 PO), Take 1 tablet by mouth daily, Disp: , Rfl:     sulfamethoxazole-trimethoprim (BACTRIM DS) 800-160 mg per tablet, Take 1 tablet by mouth every 12 (twelve) hours for 7 days, Disp: 14 tablet, Rfl: 0    Current Allergies     Allergies as of 06/24/2018 - Reviewed 06/24/2018   Allergen Reaction Noted    Prozac [fluoxetine]  06/24/2018    Vicodin [hydrocodone-acetaminophen] GI Intolerance 06/30/2015            The following portions of the patient's history were reviewed and updated as appropriate: allergies, current medications, past family history, past medical history, past social history, past surgical history and problem list      Past Medical History:   Diagnosis Date    Anxiety     Asthma     Complication of anesthesia     Group beta Strep positive     Hemorrhoids during pregnancy     Urinary tract infection affecting pregnancy     Varicella     Visual impairment        Past Surgical History:   Procedure Laterality Date    ELBOW SURGERY      WISDOM TOOTH EXTRACTION      WRIST SURGERY         Family History   Problem Relation Age of Onset    No Known Problems Mother     No Known Problems Father          Medications have been verified  Incision and drain  Date/Time: 6/24/2018 5:01 PM  Performed by: Zara Ellison by: Jose Keene     Patient location:  Clinic  Consent:     Consent obtained:  Verbal    Consent given by:  Patient    Risks discussed:  Bleeding and pain    Alternatives discussed:  No treatment and alternative treatment  Universal protocol:     Procedure explained and questions answered to patient or proxy's satisfaction: yes      Patient identity confirmed:  Verbally with patient  Location:     Type:  Abscess  Pre-procedure details:     Skin preparation:  Betadine  Anesthesia (see MAR for exact dosages):      Anesthesia method:  Local infiltration    Local anesthetic:  Lidocaine 1% w/o epi  Procedure details:     Complexity:  Simple    Needle aspiration: no      Incision types:  Stab incision and single straight    Scalpel blade:  11    Approach:  Puncture    Incision depth:  Subcutaneous    Drainage:  Bloody    Drainage amount: Moderate    Wound treatment:  Wound left open    Packing materials:  None  Post-procedure details:     Patient tolerance of procedure: Tolerated well, no immediate complications        Objective   /87 (BP Location: Right arm, Patient Position: Sitting, Cuff Size: Adult)   Pulse 79   Temp 97 7 °F (36 5 °C) (Tympanic)   Resp 18   Ht 5' 7" (1 702 m)   Wt 59 kg (130 lb)   SpO2 100%   BMI 20 36 kg/m²        Physical Exam     Physical Exam   Constitutional: She is oriented to person, place, and time  She appears well-developed and well-nourished  HENT:   Head: Normocephalic and atraumatic  Eyes: Conjunctivae and EOM are normal  Pupils are equal, round, and reactive to light  Neck: Normal range of motion  Neck supple  Cardiovascular: Normal rate and regular rhythm  Pulmonary/Chest: Effort normal and breath sounds normal    Musculoskeletal: She exhibits tenderness  Neurological: She is alert and oriented to person, place, and time  Skin: Skin is warm and dry  No rash noted  Erythema with fluctuant lesion left lower leg lateral aspect   Nursing note and vitals reviewed

## 2018-06-25 ENCOUNTER — TELEPHONE (OUTPATIENT)
Dept: URGENT CARE | Facility: CLINIC | Age: 23
End: 2018-06-25

## 2018-06-25 NOTE — TELEPHONE ENCOUNTER
I discussed results with patient  She is doing better  She has no new complaints  She was told to let us know if she has further problems

## 2018-06-27 LAB
BACTERIA WND AEROBE CULT: ABNORMAL
GRAM STN SPEC: ABNORMAL

## 2018-06-29 ENCOUNTER — TELEPHONE (OUTPATIENT)
Dept: URGENT CARE | Facility: CLINIC | Age: 23
End: 2018-06-29

## 2018-06-29 NOTE — TELEPHONE ENCOUNTER
I discussed results with patient she is doing much better  Told her to continue antibiotic as prescribed and let us know she has a further problems

## 2018-12-11 ENCOUNTER — ANNUAL EXAM (OUTPATIENT)
Dept: OBGYN CLINIC | Facility: MEDICAL CENTER | Age: 23
End: 2018-12-11
Payer: COMMERCIAL

## 2018-12-11 VITALS
BODY MASS INDEX: 21.35 KG/M2 | SYSTOLIC BLOOD PRESSURE: 102 MMHG | DIASTOLIC BLOOD PRESSURE: 60 MMHG | WEIGHT: 136 LBS | HEIGHT: 67 IN

## 2018-12-11 DIAGNOSIS — Z01.419 ENCNTR FOR GYN EXAM (GENERAL) (ROUTINE) W/O ABN FINDINGS: Primary | ICD-10-CM

## 2018-12-11 PROCEDURE — S0612 ANNUAL GYNECOLOGICAL EXAMINA: HCPCS | Performed by: NURSE PRACTITIONER

## 2018-12-12 NOTE — PROGRESS NOTES
ASSESSMENT & PLAN: Sagar Oliver is a 24 y o  Luke Giraldo with normal gynecologic exam     1   Routine well woman exam done today  2  Pap and HPV:  The patient's last pap was 2016  It was normal     Pap was not done today  Current ASCCP Guidelines reviewed  3   STD testing  was not done   4  Gardasil recommendations reviewed   5  The following were reviewed in today's visit: breast self exam, adequate intake of calcium and vitamin D, exercise and healthy diet  6  rto one year  CC:  Annual Gynecologic Examination    HPI: Sagar Oliver is a 24 y o  Luke Giraldo who presents for annual gynecologic examination  She has the following concerns:  None, happy with nexplanon as bcm  Health Maintenance:    She wears her seatbelt routinely  She does perform regular monthly self breast exams  She feels safe at home  Past Medical History:   Diagnosis Date    Anxiety     Asthma     Complication of anesthesia     Group beta Strep positive     Hemorrhoids during pregnancy     Urinary tract infection affecting pregnancy     Varicella     Visual impairment        Past Surgical History:   Procedure Laterality Date    ELBOW SURGERY      WISDOM TOOTH EXTRACTION      WRIST SURGERY         OB/Gyn History:    Pt does not have menstrual issues  History of sexually transmitted infection: No   History of abnormal pap smears: No      Patient is currently sexually active  The current method of family planning is nexplanon  OB History      Para Term  AB Living    1 1 1     1    SAB TAB Ectopic Multiple Live Births          0 1          Family History   Problem Relation Age of Onset    No Known Problems Mother     No Known Problems Father        Social History:  Social History     Social History    Marital status: /Civil Union     Spouse name: N/A    Number of children: N/A    Years of education: N/A     Occupational History    Not on file       Social History Main Topics    Smoking status: Never Smoker    Smokeless tobacco: Never Used    Alcohol use No    Drug use: No    Sexual activity: Yes     Partners: Male     Birth control/ protection: Implant     Other Topics Concern    Not on file     Social History Narrative    No narrative on file     Patient is   Patient is currently employed     Allergies   Allergen Reactions    Prozac [Fluoxetine]     Vicodin [Hydrocodone-Acetaminophen] GI Intolerance         Current Outpatient Prescriptions:     albuterol (PROVENTIL HFA,VENTOLIN HFA) 90 mcg/act inhaler, Inhale 2 puffs every 6 (six) hours as needed for wheezing, Disp: , Rfl:     Cyanocobalamin (B-12 PO), Take by mouth daily, Disp: , Rfl:     diphenhydrAMINE (BENADRYL) 25 mg tablet, Take 25 mg by mouth as needed for itching, Disp: , Rfl:     Ergocalciferol (VITAMIN D2 PO), Take by mouth daily, Disp: , Rfl:     Review of Systems:  Constitutional :no fever, feels well, no tiredness, no recent weight gain or loss  ENT: no ear ache, no loss of hearing, no nosebleeds or nasal discharge, no sore throat or hoarseness  Cardiovascular: no complaints of slow or fast heart beat, no chest pain, no palpitations, no leg claudication or lower extremity edema  Respiratory: no complaints of shortness of shortness of breath, no CORTEZ  Breasts:no complaints of breast pain, breast lump, or nipple discharge  Gastrointestinal: no complaints of abdominal pain, constipation, nausea, vomiting, or diarrhea or bloody stools  Genitourinary : no complaints of dysuria, incontinence, pelvic pain, no dysmenorrhea, vaginal discharge or abnormal vaginal bleeding and as noted in HPI  Musculoskeletal: no complaints of arthralgia, no myalgia, no joint swelling or stiffness, no limb pain or swelling    Integumentary: no complaints of skin rash or lesion, itching or dry skin  Neurological: no complaints of headache, no confusion, no numbness or tingling, no dizziness or fainting    Objective /60   Ht 5' 7" (1 702 m)   Wt 61 7 kg (136 lb)   BMI 21 30 kg/m²     General:   appears stated age, cooperative, alert normal mood and affect   Neck: normal, supple,trachea midline, no masses   Heart: regular rate and rhythm, S1, S2 normal, no murmur, click, rub or gallop   Lungs: clear to auscultation bilaterally   Breasts: normal appearance, no masses or tenderness   Abdomen: soft, non-tender, without masses or organomegaly   Vulva: normal   Vagina: normal vagina   Urethra: normal   Cervix: Normal, no discharge  Uterus: normal size, contour, position, consistency, mobility, non-tender   Adnexa: normal adnexa   Lymphatic palpation of lymph nodes in neck, axilla, groin and/or other locations: no lymphadenopathy or masses noted   Skin normal skin turgor and no rashes     Psychiatric orientation to person, place, and time: normal  mood and affect: normal

## 2020-01-27 ENCOUNTER — ANNUAL EXAM (OUTPATIENT)
Dept: OBGYN CLINIC | Facility: MEDICAL CENTER | Age: 25
End: 2020-01-27
Payer: COMMERCIAL

## 2020-01-27 VITALS — BODY MASS INDEX: 22.04 KG/M2 | DIASTOLIC BLOOD PRESSURE: 70 MMHG | WEIGHT: 140.7 LBS | SYSTOLIC BLOOD PRESSURE: 110 MMHG

## 2020-01-27 DIAGNOSIS — Z01.419 ENCOUNTER FOR GYNECOLOGICAL EXAMINATION WITH PAPANICOLAOU SMEAR OF CERVIX: Primary | ICD-10-CM

## 2020-01-27 PROCEDURE — S0612 ANNUAL GYNECOLOGICAL EXAMINA: HCPCS | Performed by: OBSTETRICS & GYNECOLOGY

## 2020-01-27 PROCEDURE — G0145 SCR C/V CYTO,THINLAYER,RESCR: HCPCS | Performed by: OBSTETRICS & GYNECOLOGY

## 2020-01-27 NOTE — PROGRESS NOTES
ASSESSMENT & PLAN: Kulwant Calderon is a 21 y o  Rodney Salt with normal gynecologic exam     1   Routine well woman exam done today  2  Pap:  The patient's last pap was 2016  It was normal     Pap was done today  Current ASCCP Guidelines reviewed  3   STD testing  was not done   4  The following were reviewed in today's visit: breast self exam  6  Contraception: Nexplanon    CC:  Annual Gynecologic Examination    HPI: Kulwant Calderon is a 21 y o  Rodney Salt who presents for annual gynecologic examination  She has the following concerns:  Reports irregular cycles with Nexplanon  Not bothersome  Health Maintenance:    She wears her seatbelt routinely  She does perform regular monthly self breast exams  She feels safe at home  Past Medical History:   Diagnosis Date    Anxiety     Asthma     Complication of anesthesia     Group beta Strep positive     Hemorrhoids during pregnancy     Urinary tract infection affecting pregnancy     Varicella     Visual impairment        Past Surgical History:   Procedure Laterality Date    ELBOW SURGERY      WISDOM TOOTH EXTRACTION      WRIST SURGERY         OB/Gyn History:    Pt has menstrual issues  See above    History of sexually transmitted infection: No   History of abnormal pap smears: No     Patient is currently sexually active  The current method of family planning is Nexplanon      OB History        1    Para   1    Term   1            AB        Living   1       SAB        TAB        Ectopic        Multiple   0    Live Births   1                 Family History   Problem Relation Age of Onset    No Known Problems Mother     No Known Problems Father     Breast cancer Other        Social History:  Social History     Socioeconomic History    Marital status: /Civil Union     Spouse name: Not on file    Number of children: Not on file    Years of education: Not on file    Highest education level: Not on file Occupational History    Not on file   Social Needs    Financial resource strain: Not on file    Food insecurity:     Worry: Not on file     Inability: Not on file    Transportation needs:     Medical: Not on file     Non-medical: Not on file   Tobacco Use    Smoking status: Never Smoker    Smokeless tobacco: Never Used   Substance and Sexual Activity    Alcohol use: Yes     Frequency: Monthly or less    Drug use: No    Sexual activity: Yes     Partners: Male     Birth control/protection: Implant, None   Lifestyle    Physical activity:     Days per week: Not on file     Minutes per session: Not on file    Stress: Not on file   Relationships    Social connections:     Talks on phone: Not on file     Gets together: Not on file     Attends Faith service: Not on file     Active member of club or organization: Not on file     Attends meetings of clubs or organizations: Not on file     Relationship status: Not on file    Intimate partner violence:     Fear of current or ex partner: Not on file     Emotionally abused: Not on file     Physically abused: Not on file     Forced sexual activity: Not on file   Other Topics Concern    Not on file   Social History Narrative    Not on file     Patient is         Allergies   Allergen Reactions    Prozac [Fluoxetine]     Vicodin [Hydrocodone-Acetaminophen] GI Intolerance         Current Outpatient Medications:     albuterol (PROVENTIL HFA,VENTOLIN HFA) 90 mcg/act inhaler, Inhale 2 puffs every 6 (six) hours as needed for wheezing, Disp: , Rfl:     Cyanocobalamin (B-12 PO), Take by mouth daily, Disp: , Rfl:     diphenhydrAMINE (BENADRYL) 25 mg tablet, Take 25 mg by mouth as needed for itching, Disp: , Rfl:     Ergocalciferol (VITAMIN D2 PO), Take by mouth daily, Disp: , Rfl:     Probiotic Product (SOLUBLE FIBER/PROBIOTICS PO), Take by mouth, Disp: , Rfl:     Review of Systems:  Constitutional :no fever, feels well, no tiredness, no recent weight gain or loss  ENT: no ear ache, no loss of hearing, no nosebleeds or nasal discharge, no sore throat or hoarseness  Cardiovascular: no complaints of slow or fast heart beat, no chest pain, no palpitations, no leg claudication or lower extremity edema  Respiratory: no complaints of shortness of shortness of breath, no CORTEZ  Breasts:no complaints of breast pain, breast lump, or nipple discharge  Gastrointestinal: no complaints of abdominal pain, constipation, nausea, vomiting, or diarrhea or bloody stools  Genitourinary : no complaints of dysuria, incontinence, pelvic pain, no dysmenorrhea, vaginal discharge or abnormal vaginal bleeding and as noted in HPI  Musculoskeletal: no complaints of arthralgia, no myalgia, no joint swelling or stiffness, no limb pain or swelling  Integumentary: no complaints of skin rash or lesion, itching or dry skin  Neurological: no complaints of headache, no confusion, no numbness or tingling, no dizziness or fainting    Objective      /70   Wt 63 8 kg (140 lb 11 2 oz)   LMP 01/20/2020 (Approximate)   BMI 22 04 kg/m²     General:   appears stated age, cooperative, alert normal mood and affect   Neck: normal, supple,trachea midline, no masses   Heart: regular rate and rhythm, S1, S2 normal, no murmur, click, rub or gallop   Lungs: clear to auscultation bilaterally   Breasts: normal appearance, no masses or tenderness, Inspection negative, No nipple retraction or dimpling, No nipple discharge or bleeding, No axillary or supraclavicular adenopathy, Normal to palpation without dominant masses   Abdomen: soft, non-tender, without masses or organomegaly   Vulva: normal female genitalia, Bartholin's, Urethra, [place] normal, no lesions, normal female hair distribution, no clitoral enlargement   Vagina: normal vagina, no discharge, exudate, lesion, or erythema   Urethra: normal   Cervix: Normal, no discharge  PAP done     Uterus: normal size, contour, position, consistency, mobility, non-tender   Adnexa: normal adnexa and no mass, fullness, tenderness   Lymphatic palpation of lymph nodes in neck, axilla, groin and/or other locations: no lymphadenopathy or masses noted   Skin normal skin turgor and no rashes     Psychiatric orientation to person, place, and time: normal  mood and affect: normal

## 2020-01-30 LAB
LAB AP GYN PRIMARY INTERPRETATION: NORMAL
Lab: NORMAL

## 2020-04-23 ENCOUNTER — OFFICE VISIT (OUTPATIENT)
Dept: URGENT CARE | Facility: CLINIC | Age: 25
End: 2020-04-23
Payer: COMMERCIAL

## 2020-04-23 VITALS
WEIGHT: 140 LBS | HEART RATE: 104 BPM | HEIGHT: 67 IN | BODY MASS INDEX: 21.97 KG/M2 | OXYGEN SATURATION: 100 % | DIASTOLIC BLOOD PRESSURE: 87 MMHG | RESPIRATION RATE: 16 BRPM | TEMPERATURE: 98.6 F | SYSTOLIC BLOOD PRESSURE: 139 MMHG

## 2020-04-23 DIAGNOSIS — H92.02 ACUTE OTALGIA, LEFT: Primary | ICD-10-CM

## 2020-04-23 DIAGNOSIS — H61.22 IMPACTED CERUMEN OF LEFT EAR: ICD-10-CM

## 2020-04-23 DIAGNOSIS — H61.892 IRRITATION OF EXTERNAL EAR CANAL, LEFT: ICD-10-CM

## 2020-04-23 PROCEDURE — 69210 REMOVE IMPACTED EAR WAX UNI: CPT | Performed by: PHYSICIAN ASSISTANT

## 2020-04-23 PROCEDURE — 99213 OFFICE O/P EST LOW 20 MIN: CPT | Performed by: PHYSICIAN ASSISTANT

## 2020-04-23 RX ORDER — OFLOXACIN 3 MG/ML
10 SOLUTION AURICULAR (OTIC) 2 TIMES DAILY
Qty: 10 ML | Refills: 0 | Status: SHIPPED | OUTPATIENT
Start: 2020-04-23 | End: 2020-04-30

## 2020-08-23 ENCOUNTER — HOSPITAL ENCOUNTER (EMERGENCY)
Facility: HOSPITAL | Age: 25
Discharge: HOME | End: 2020-08-23
Attending: EMERGENCY MEDICINE | Admitting: EMERGENCY MEDICINE
Payer: COMMERCIAL

## 2020-08-23 VITALS
WEIGHT: 135 LBS | RESPIRATION RATE: 18 BRPM | TEMPERATURE: 99.4 F | OXYGEN SATURATION: 100 % | DIASTOLIC BLOOD PRESSURE: 87 MMHG | SYSTOLIC BLOOD PRESSURE: 138 MMHG | BODY MASS INDEX: 21.14 KG/M2 | HEART RATE: 120 BPM
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
## 2020-08-23 PROCEDURE — 99284 EMERGENCY DEPT VISIT MOD MDM: CPT | Performed by: PHYSICIAN ASSISTANT

## 2020-08-23 PROCEDURE — 99283 EMERGENCY DEPT VISIT LOW MDM: CPT

## 2020-08-23 RX ORDER — AMOXICILLIN AND CLAVULANATE POTASSIUM 875; 125 MG/1; MG/1
1 TABLET, FILM COATED ORAL EVERY 12 HOURS
Qty: 14 TABLET | Refills: 0 | Status: SHIPPED | OUTPATIENT
Start: 2020-08-23 | End: 2020-08-30

## 2020-08-23 RX ORDER — AMOXICILLIN AND CLAVULANATE POTASSIUM 875; 125 MG/1; MG/1
1 TABLET, FILM COATED ORAL ONCE
Status: COMPLETED | OUTPATIENT
Start: 2020-08-23 | End: 2020-08-23

## 2020-08-23 RX ADMIN — AMOXICILLIN AND CLAVULANATE POTASSIUM 1 TABLET: 875; 125 TABLET, FILM COATED ORAL at 18:46

## 2020-08-23 NOTE — ED PROVIDER NOTES
History  Chief Complaint   Patient presents with    Dog Bite     pateint was jogging along the road a few dorrs down and that neighbor has an electric fence and she never had an issue with the dog before and today if ran out across the fencs and bit her on her left hand [place]     54-year-old female presents to the emergency department for evaluation of dog bite on left hand  Patient states she often runs by a house with the dogs typically around to the fence to work at her  She reports today 1 dog broke through the electric fence and came up to her  She reports she reached her hand down for the dog to smell her when the dog bit her left hand  She reports the owner did come out take the dog back inside  She notes when he went inside with the dog she became afraid that the other dog would come out so she ran home  Patient states she is unsure dog is up-to-date on immunizations  We discussed rabies vaccine patient would like forego this at this time and follow-up with the neighbor  Patient states she is going to have the police check to see if the dog's up-to-date on vaccinations unsure the dog was watched for 10 days  Patient states her tetanus is up to date  She denies any decreased ROM in the hand  She denies any weakness, numbness, paresthesias  Patient denies any other injury  Prior to Admission Medications   Prescriptions Last Dose Informant Patient Reported? Taking?    Cyanocobalamin (B-12 PO)   Yes No   Sig: Take by mouth daily   Ergocalciferol (VITAMIN D2 PO)   Yes No   Sig: Take by mouth daily   Probiotic Product (SOLUBLE FIBER/PROBIOTICS PO)   Yes No   Sig: Take by mouth   albuterol (PROVENTIL HFA,VENTOLIN HFA) 90 mcg/act inhaler   Yes No   Sig: Inhale 2 puffs every 6 (six) hours as needed for wheezing   diphenhydrAMINE (BENADRYL) 25 mg tablet   Yes No   Sig: Take 25 mg by mouth as needed for itching      Facility-Administered Medications: None       Past Medical History: Diagnosis Date    Anxiety     Asthma     Complication of anesthesia     Group beta Strep positive     Hemorrhoids during pregnancy     Urinary tract infection affecting pregnancy     Varicella     Visual impairment        Past Surgical History:   Procedure Laterality Date    ELBOW SURGERY      WISDOM TOOTH EXTRACTION      WRIST SURGERY         Family History   Problem Relation Age of Onset    No Known Problems Mother     No Known Problems Father     Breast cancer Other      I have reviewed and agree with the history as documented  E-Cigarette/Vaping    E-Cigarette Use Never User      E-Cigarette/Vaping Substances     Social History     Tobacco Use    Smoking status: Never Smoker    Smokeless tobacco: Never Used   Substance Use Topics    Alcohol use: Yes     Frequency: Monthly or less    Drug use: No       Review of Systems   Constitutional: Negative  HENT: Negative  Eyes: Negative for visual disturbance  Respiratory: Negative  Cardiovascular: Negative  Gastrointestinal: Negative  Genitourinary: Negative  Musculoskeletal: Negative  Skin: Positive for color change and wound  Neurological: Negative  Psychiatric/Behavioral: Negative  All other systems reviewed and are negative  Physical Exam  Physical Exam  Vitals signs and nursing note reviewed  Constitutional:       General: She is not in acute distress  Appearance: Normal appearance  She is normal weight  She is not ill-appearing, toxic-appearing or diaphoretic  HENT:      Head: Normocephalic and atraumatic  Nose: Nose normal       Mouth/Throat:      Mouth: Mucous membranes are moist    Eyes:      Extraocular Movements: Extraocular movements intact  Conjunctiva/sclera: Conjunctivae normal       Pupils: Pupils are equal, round, and reactive to light  Neck:      Musculoskeletal: Normal range of motion  Cardiovascular:      Rate and Rhythm: Normal rate and regular rhythm        Pulses: Radial pulses are 2+ on the right side and 2+ on the left side  Heart sounds: No murmur  Pulmonary:      Effort: Pulmonary effort is normal       Breath sounds: Normal breath sounds  No wheezing, rhonchi or rales  Chest:      Chest wall: No tenderness  Abdominal:      General: Abdomen is flat  Bowel sounds are normal  There is no distension  Tenderness: There is no abdominal tenderness  There is no guarding  Musculoskeletal: Normal range of motion  General: Tenderness present  No deformity  Left wrist: Normal       Left hand: She exhibits tenderness (salinas, superficial lacerations)  She exhibits normal range of motion, no bony tenderness, normal capillary refill, no deformity, no laceration and no swelling  Normal sensation noted  Normal strength noted  Skin:     General: Skin is warm and dry  Capillary Refill: Capillary refill takes less than 2 seconds  Findings: Signs of injury present  Neurological:      General: No focal deficit present  Mental Status: She is alert and oriented to person, place, and time  Sensory: No sensory deficit  Motor: No weakness  Coordination: Coordination normal       Gait: Gait normal       Deep Tendon Reflexes: Reflexes normal    Psychiatric:         Mood and Affect: Mood normal          Behavior: Behavior normal          Thought Content:  Thought content normal          Judgment: Judgment normal          Vital Signs  ED Triage Vitals [08/23/20 1824]   Temperature Pulse Respirations Blood Pressure SpO2   99 4 °F (37 4 °C) (!) 120 18 138/87 100 %      Temp Source Heart Rate Source Patient Position - Orthostatic VS BP Location FiO2 (%)   Temporal Monitor Sitting Right arm --      Pain Score       3           Vitals:    08/23/20 1824   BP: 138/87   Pulse: (!) 120   Patient Position - Orthostatic VS: Sitting         Visual Acuity      ED Medications  Medications   amoxicillin-clavulanate (AUGMENTIN) 875-125 mg per tablet 1 tablet (1 tablet Oral Given 8/23/20 1846)       Diagnostic Studies  Results Reviewed     None                 No orders to display              Procedures  Procedures         ED Course       US AUDIT      Most Recent Value   Initial Alcohol Screen: US AUDIT-C    1  How often do you have a drink containing alcohol?  0 Filed at: 08/23/2020 1826   2  How many drinks containing alcohol do you have on a typical day you are drinking? 0 Filed at: 08/23/2020 1826   3a  Male UNDER 65: How often do you have five or more drinks on one occasion? 0 Filed at: 08/23/2020 1826   3b  FEMALE Any Age, or MALE 65+: How often do you have 4 or more drinks on one occassion? 0 Filed at: 08/23/2020 1826   Audit-C Score  0 Filed at: 08/23/2020 1826                  CHANCE/DAST-10      Most Recent Value   How many times in the past year have you    Used an illegal drug or used a prescription medication for non-medical reasons? Never Filed at: 08/23/2020 1826                  MDM  Number of Diagnoses or Management Options  Dog bite, initial encounter: new and does not require workup  Diagnosis management comments: 26-year-old female presents emergency department for evaluation of dog bite on left palm  Dog belonged to her neighbor, patient was unable to ask if dog was up-to-date on immunizations no plans to do so after leaving emergency department  Patient denies rabies vaccine at this time  Tetanus up-to-date per patient  Patient has 3 small superficial wounds to left palm  She has normal range of motion  Patient was started on Augmentin  She was educated on strict return precautions  Patient was educated on symptoms that require prompt return to the ED for further evaluation verbalized understanding  She agreed to treatment plan, remained well ED and was discharged home           Disposition  Final diagnoses:   Dog bite, initial encounter     Time reflects when diagnosis was documented in both MDM as applicable and the Disposition within this note     Time User Action Codes Description Comment    8/23/2020  6:46 PM Rajesh Goins [V35  0XXA] Dog bite, initial encounter       ED Disposition     ED Disposition Condition Date/Time Comment    Discharge Stable Sun Aug 23, 2020  6:46 PM [id number] discharge to home/self care  Follow-up Information     Follow up With Specialties Details Why Contact Info    Joey Chowdhury MD Family Medicine In 1 week As needed, For wound re-check [id number] [id number] [phone]            Discharge Medication List as of 8/23/2020  6:51 PM      START taking these medications    Details   amoxicillin-clavulanate (AUGMENTIN) 875-125 mg per tablet Take 1 tablet by mouth every 12 (twelve) hours for 7 days, Starting Sun 8/23/2020, Until Sun 8/30/2020, Normal         CONTINUE these medications which have NOT CHANGED    Details   albuterol (PROVENTIL HFA,VENTOLIN HFA) 90 mcg/act inhaler Inhale 2 puffs every 6 (six) hours as needed for wheezing, Until Discontinued, Historical Med      Cyanocobalamin (B-12 PO) Take by mouth daily, Historical Med      diphenhydrAMINE (BENADRYL) 25 mg tablet Take 25 mg by mouth as needed for itching, Until Discontinued, Historical Med      Ergocalciferol (VITAMIN D2 PO) Take by mouth daily, Historical Med      Probiotic Product (SOLUBLE FIBER/PROBIOTICS PO) Take by mouth, Historical Med           No discharge procedures on file      PDMP Review     None          ED Provider  Electronically Signed by           Toya Ewing PA-C  08/23/20 2916

## 2020-08-23 NOTE — Clinical Note
Narayan Garcia was seen and treated in our emergency department on 8/23/2020  Please limit use of left hand until 8/27/20  Use hand as tolerated    Diagnosis:      Jessi Gaston  may return to work on return date  She may return on this date: 08/24/2020          If you have any questions or concerns, please don't hesitate to call        Valeria Chen PA-C    ______________________________           _______________          _______________  Hospital Representative                              Date                                Time

## 2020-08-23 NOTE — DISCHARGE INSTRUCTIONS
Please make sure to check with neighbor on rabies vaccination status of their dog, if dog is not vaccinated should be watched for the next 10 days to ensure the dog does not develop any signs of rabies  If the dog has not been vaccinated and you would like to start the rabies vaccine please return to the ED  If the dog develops any signs and symptoms of rabies please return to the ED asap  Please follow up with your family doctor for a wound re-check     If you experience any signs infection please return to the emergency department  Please take antibiotic in full as prescribed

## 2020-08-24 NOTE — ED ATTENDING ATTESTATION
8/23/2020  I, Ara Her DO, discussed the patient with the resident/non-physician practitioner and agree with the resident's/non-physician practitioner's findings, Plan of Care, and MDM as documented in the resident's/non-physician practitioner's note, except where noted  All available labs and Radiology studies were reviewed  I was present for key portions of any procedure(s) performed by the resident/non-physician practitioner and I was immediately available to provide assistance  At this point I agree with the current assessment done in the Emergency Department

## 2021-01-19 ENCOUNTER — TELEPHONE (OUTPATIENT)
Dept: OBGYN CLINIC | Facility: MEDICAL CENTER | Age: 26
End: 2021-01-19

## 2021-01-19 ENCOUNTER — PROCEDURE VISIT (OUTPATIENT)
Dept: OBGYN CLINIC | Facility: MEDICAL CENTER | Age: 26
End: 2021-01-19
Payer: COMMERCIAL

## 2021-01-19 VITALS
SYSTOLIC BLOOD PRESSURE: 128 MMHG | HEIGHT: 67 IN | BODY MASS INDEX: 22.85 KG/M2 | DIASTOLIC BLOOD PRESSURE: 80 MMHG | WEIGHT: 145.6 LBS

## 2021-01-19 DIAGNOSIS — Z30.46 ENCOUNTER FOR SURVEILLANCE OF IMPLANTABLE SUBDERMAL CONTRACEPTIVE: Primary | ICD-10-CM

## 2021-01-19 PROCEDURE — 11983 REMOVE/INSERT DRUG IMPLANT: CPT | Performed by: STUDENT IN AN ORGANIZED HEALTH CARE EDUCATION/TRAINING PROGRAM

## 2021-01-19 RX ORDER — SACCHAROMYCES BOULARDII 250 MG
250 CAPSULE ORAL 2 TIMES DAILY
COMMUNITY
End: 2022-02-07

## 2021-01-19 RX ORDER — ASCORBIC ACID 1000 MG
TABLET ORAL
COMMUNITY

## 2021-01-19 NOTE — PROGRESS NOTES
[facility] [address] [place] [address]    [place] Protocol:  Consent: Written consent obtained  Risks and benefits: risks, benefits and alternatives were discussed  Consent given by: patient  Timeout called at: 1/19/2021 11:30 AM   Patient understanding: patient states understanding of the procedure being performed  Patient consent: the patient's understanding of the procedure matches consent given  Procedure consent: procedure consent matches procedure scheduled  Relevant documents: relevant documents present and verified  Patient identity confirmed: verbally with patient    Remove and insert drug implant    Date/Time: 1/19/2021 12:25 PM  Performed by: Marilyn Castellano MD  Authorized by: Marilyn Castellano MD     Indication:     Indication: Presence of non-biodegradable drug delivery implant    Pre-procedure:     Prepped with: alcohol 70%      Local anesthetic:  Lidocaine without epinephrine    The site was cleaned and prepped in a sterile fashion: yes    Procedure:     Procedure: Insertion    Small stab incision was made in arm: yes      Left/right:  Left    Visualization of implant was obtained: yes      Contraceptive capsule was inserted and trocar removed: yes      Visualization of notch in stylet and palpation of device: yes      Site was closed with steri-strips and pressure bandage applied: yes    Comments:      Uncomplicated Nexplanon removal and reinsertion  Steri strip applied  Left arm wrapped with co-ban  Patient tolerated the procedure well        Nathan Johnson MD  [address] [place] [address]  1/19/2021 12:28 PM

## 2021-01-19 NOTE — TELEPHONE ENCOUNTER
Pt has new [facility]  [id number]  Telephone# is [phone]    Effective 9/16/20  Pt is covered for removal, reinsertion and device at 100%  Pt will only be responsible for $35 copay  No PA needed  Al Ref# [id number]  Pt is aware

## 2021-01-22 ENCOUNTER — OFFICE VISIT (OUTPATIENT)
Dept: URGENT CARE | Facility: CLINIC | Age: 26
End: 2021-01-22
Payer: COMMERCIAL

## 2021-01-22 VITALS
WEIGHT: 145 LBS | RESPIRATION RATE: 16 BRPM | OXYGEN SATURATION: 98 % | TEMPERATURE: 97.5 F | HEART RATE: 72 BPM | HEIGHT: 67 IN | BODY MASS INDEX: 22.76 KG/M2

## 2021-01-22 DIAGNOSIS — J06.9 ACUTE RESPIRATORY DISEASE: Primary | ICD-10-CM

## 2021-01-22 PROCEDURE — G0382 LEV 3 HOSP TYPE B ED VISIT: HCPCS | Performed by: PHYSICIAN ASSISTANT

## 2021-01-22 PROCEDURE — U0003 INFECTIOUS AGENT DETECTION BY NUCLEIC ACID (DNA OR RNA); SEVERE ACUTE RESPIRATORY SYNDROME CORONAVIRUS 2 (SARS-COV-2) (CORONAVIRUS DISEASE [COVID-19]), AMPLIFIED PROBE TECHNIQUE, MAKING USE OF HIGH THROUGHPUT TECHNOLOGIES AS DESCRIBED BY CMS-2020-01-R: HCPCS | Performed by: PHYSICIAN ASSISTANT

## 2021-01-22 PROCEDURE — U0005 INFEC AGEN DETEC AMPLI PROBE: HCPCS | Performed by: PHYSICIAN ASSISTANT

## 2021-01-22 RX ORDER — LANOLIN ALCOHOL/MO/W.PET/CERES
1 CREAM (GRAM) TOPICAL 3 TIMES DAILY
COMMUNITY

## 2021-01-22 NOTE — LETTER
January 22, 2021     Patient: [facility]   YOB: 1995   Date of Visit: 1/22/2021       To Whom It May Concern: It is my medical opinion that Zettie Drilling should remain out of work until cleared by a healthcare provider  If you have any questions or concerns, please don't hesitate to call           Sincerely,        Zo Lim PA-C    CC: No Recipients

## 2021-01-22 NOTE — PATIENT INSTRUCTIONS
You may develop symptoms at any point within 14 days after exposure  You are contagious up to 1-2 days before symptom onset  Vitamin D3 2000 IU daily  Vitamin C 1g every 12 hours  Multivitamin daily  Fluids and rest  Over the counter cold medication as needed  Follow up with PCP in 3-5 days  Proceed to  ER if symptoms worsen  [address]    Your healthcare provider and/or public health staff have evaluated you and have determined that you do not need to remain in the hospital at this time  At this time you can be isolated at home where you will be monitored by staff from your local or state health department  You should carefully follow the prevention and isolation steps below until a healthcare provider or local or state health department says that you can return to your normal activities  Stay home except to get medical care    People who are mildly ill with COVID-19 are able to isolate at home during their illness  You should restrict activities outside your home, except for getting medical care  Do not go to work, school, or public areas  Avoid using public transportation, ride-sharing, or taxis  Separate yourself from other people and animals in your home    People: As much as possible, you should stay in a specific room and away from other people in your home  Also, you should use a separate bathroom, if available  Animals: You should restrict contact with pets and other animals while you are sick with COVID-19, just like you would around other people  Although there have not been reports of pets or other animals becoming sick with COVID-19, it is still recommended that people sick with COVID-19 limit contact with animals until more information is known about the virus  When possible, have another member of your household care for your animals while you are sick   If you are sick with COVID-19, avoid contact with your pet, including petting, snuggling, being kissed or licked, and sharing food  If you must care for your pet or be around animals while you are sick, wash your hands before and after you interact with pets and wear a facemask  See COVID-19 and Animals for more information  Call ahead before visiting your doctor    If you have a medical appointment, call the healthcare provider and tell them that you have or may have COVID-19  This will help the healthcare providers office take steps to keep other people from getting infected or exposed  Wear a facemask    You should wear a facemask when you are around other people (e g , sharing a room or vehicle) or pets and before you enter a healthcare providers office  If you are not able to wear a facemask (for example, because it causes trouble breathing), then people who live with you should not stay in the same room with you, or they should wear a facemask if they enter your room  Cover your coughs and sneezes    Cover your mouth and nose with a tissue when you cough or sneeze  Throw used tissues in a lined trash can  Immediately wash your hands with soap and water for at least 20 seconds or, if soap and water are not available, clean your hands with an alcohol-based hand  that contains at least 60% alcohol  Clean your hands often    Wash your hands often with soap and water for at least 20 seconds, especially after blowing your nose, coughing, or sneezing; going to the bathroom; and before eating or preparing food  If soap and water are not readily available, use an alcohol-based hand  with at least 60% alcohol, covering all surfaces of your hands and rubbing them together until they feel dry  Soap and water are the best option if hands are visibly dirty  Avoid touching your eyes, nose, and mouth with unwashed hands  Avoid sharing personal household items    You should not share dishes, drinking glasses, cups, eating utensils, towels, or bedding with other people or pets in your home   After using these items, they should be washed thoroughly with soap and water  Clean all high-touch surfaces everyday    High touch surfaces include counters, tabletops, doorknobs, bathroom fixtures, toilets, phones, keyboards, tablets, and bedside tables  Also, clean any surfaces that may have blood, stool, or body fluids on them  Use a household cleaning spray or wipe, according to the label instructions  Labels contain instructions for safe and effective use of the cleaning product including precautions you should take when applying the product, such as wearing gloves and making sure you have good ventilation during use of the product  Monitor your symptoms    Seek prompt medical attention if your illness is worsening (e g , difficulty breathing)  Before seeking care, call your healthcare provider and tell them that you have, or are being evaluated for, COVID-19  Put on a facemask before you enter the facility  These steps will help the healthcare providers office to keep other people in the office or waiting room from getting infected or exposed  Ask your healthcare provider to call the local or [facility] health department  Persons who are placed under active monitoring or facilitated self-monitoring should follow instructions provided by their local health department or occupational health professionals, as appropriate  If you have a medical emergency and need to call 911, notify the dispatch personnel that you have, or are being evaluated for COVID-19  If possible, put on a facemask before emergency medical services arrive      Discontinuing home isolation    Patients with confirmed COVID-19 should remain under home isolation precautions until the following conditions are met:   - They have had no fever for at least 24 hours (that is one full day of no fever without the use medicine that reduces fevers)  AND  - other symptoms have improved (for example, when their cough or shortness of breath have improved)  AND  - If had mild or moderate illness, at least 10 days have passed since their symptoms first appeared or if severe illness (needed oxygen) or immunosuppressed, at least 20 days have passed since symptoms first appeared  Patients with confirmed COVID-19 should also notify close contacts (including their workplace) and ask that they self-quarantine  Currently, close contact is defined as being within 6 feet for 15 minutes or more from the period 24 hours starting 48 hours before symptom onset to the time at which the patient went into isolation  Close contacts of patients diagnosed with COVID-19 should be instructed by the patient to self-quarantine for 14 days from the last time of their last contact with the patient       Source: [employer]

## 2021-01-23 LAB — SARS-COV-2 RNA RESP QL NAA+PROBE: NEGATIVE

## 2021-01-25 ENCOUNTER — TELEPHONE (OUTPATIENT)
Dept: OBGYN CLINIC | Facility: MEDICAL CENTER | Age: 26
End: 2021-01-25

## 2021-01-25 NOTE — TELEPHONE ENCOUNTER
States that her nexplanon is "poking" her bicep  Wants it checked asap  Is willing to see different provider

## 2021-01-25 NOTE — TELEPHONE ENCOUNTER
Pt left vm she got nexplanon replaced last Tuesday and is concerned it was not put in right  Please review and call pt

## 2021-01-26 ENCOUNTER — OFFICE VISIT (OUTPATIENT)
Dept: OBGYN CLINIC | Facility: MEDICAL CENTER | Age: 26
End: 2021-01-26
Payer: COMMERCIAL

## 2021-01-26 VITALS — BODY MASS INDEX: 23.34 KG/M2 | SYSTOLIC BLOOD PRESSURE: 128 MMHG | DIASTOLIC BLOOD PRESSURE: 70 MMHG | WEIGHT: 149 LBS

## 2021-01-26 DIAGNOSIS — Z30.46 ENCOUNTER FOR SURVEILLANCE OF NEXPLANON SUBDERMAL CONTRACEPTIVE: Primary | ICD-10-CM

## 2021-01-26 PROCEDURE — 99213 OFFICE O/P EST LOW 20 MIN: CPT | Performed by: NURSE PRACTITIONER

## 2021-01-26 NOTE — PROGRESS NOTES
Assessment Diagnoses and all orders for this visit:    Encounter for surveillance of Nexplanon subdermal contraceptive        Plan : implant in correct position  Pt also examed by Dr Libia Anderson who confirmed placement and reassured her it will feel better in a month  Pt advised to call if any pain sxs present in one month  31 y o  female continuing Norplant  Subjective      Vandana Koch is a 31 y o  female who presents for contraception counseling  The patient does have complaints today  The patient is sexually active  Pertinent past medical history: none  Had nexplanon inserted a few weeks ago, now is feeling the implant pushing up when she stretches her biceps  Is worried that it is going to poke through the skin       Patient Active Problem List   Diagnosis    Encntr for gyn exam (general) (routine) w/o abn findings       Past Medical History:   Diagnosis Date    Anxiety     Asthma     Complication of anesthesia     Group beta Strep positive     Hemorrhoids during pregnancy     Urinary tract infection affecting pregnancy     Varicella     Visual impairment        Past Surgical History:   Procedure Laterality Date    ELBOW SURGERY      WISDOM TOOTH EXTRACTION      WRIST SURGERY         Family History   Problem Relation Age of Onset    No Known Problems Mother     No Known Problems Father     Breast cancer Other        Social History     Socioeconomic History    Marital status: /Civil Union     Spouse name: Not on file    Number of children: Not on file    Years of education: Not on file    Highest education level: Not on file   Occupational History    Not on file   Social Needs    Financial resource strain: Not on file    Food insecurity     Worry: Not on file     Inability: Not on file    Transportation needs     Medical: Not on file     Non-medical: Not on file   Tobacco Use    Smoking status: Never Smoker    Smokeless tobacco: Never Used   Substance and Sexual Activity    Alcohol use: Yes     Frequency: Monthly or less    Drug use: No    Sexual activity: Not Currently     Partners: Male     Birth control/protection: Implant   Lifestyle    Physical activity     Days per week: Not on file     Minutes per session: Not on file    Stress: Not on file   Relationships    Social connections     Talks on phone: Not on file     Gets together: Not on file     Attends [employer] service: Not on file     Active member of club or organization: Not on file     Attends meetings of clubs or organizations: Not on file     Relationship status: Not on file    Intimate partner violence     Fear of current or ex partner: Not on file     Emotionally abused: Not on file     Physically abused: Not on file     Forced sexual activity: Not on file   Other Topics Concern    Not on file   Social History Narrative    Not on file          Current Outpatient Medications:     Coenzyme Q10 (Co Q 10) 10 MG CAPS, Take by mouth, Disp: , Rfl:     Cyanocobalamin (B-12 PO), Take by mouth daily, Disp: , Rfl:     diphenhydrAMINE (BENADRYL) 25 mg tablet, Take 25 mg by mouth as needed for itching, Disp: , Rfl:     Ergocalciferol (VITAMIN D2 PO), Take by mouth daily, Disp: , Rfl:     glucosamine-chondroitin 500-400 MG tablet, Take 1 tablet by mouth 3 (three) times a day, Disp: , Rfl:     saccharomyces boulardii (Florastor) 250 mg capsule, Take 250 mg by mouth 2 (two) times a day, Disp: , Rfl:     albuterol (PROVENTIL HFA,VENTOLIN HFA) 90 mcg/act inhaler, Inhale 2 puffs every 6 (six) hours as needed for wheezing, Disp: , Rfl:     Allergies   Allergen Reactions    Prozac [Fluoxetine]     Vicodin [Hydrocodone-Acetaminophen] GI Intolerance       Review of Systems  Constitutional :no fever, feels well, no tiredness, no recent weight gain or loss  ENT: no ear ache, no loss of hearing, no nosebleeds or nasal discharge, no sore throat or hoarseness    Cardiovascular: no complaints of slow or fast heart beat, no chest pain, no palpitations, no leg claudication or lower extremity edema  Respiratory: no complaints of shortness of shortness of breath, no CORTEZ  Breasts:no complaints of breast pain, breast lump, or nipple discharge  Gastrointestinal: no complaints of abdominal pain, constipation, nausea, vomiting, or diarrhea or bloody stools  Genitourinary : no complaints of dysuria, incontinence, pelvic pain, no dysmenorrhea, vaginal discharge or abnormal vaginal bleeding and as noted in HPI  Musculoskeletal: no complaints of arthralgia, no myalgia, no joint swelling or stiffness, no limb pain or swelling    Integumentary: no complaints of skin rash or lesion, itching or dry skin  Neurological: no complaints of headache, no confusion, no numbness or tingling, no dizziness or fainting    Objective     /70   Wt 67 6 kg (149 lb)   BMI 23 34 kg/m²

## 2021-05-21 NOTE — PROGRESS NOTES
[facility]        NAME: Bartolo Ballard is a 31 y o  female  : 1995    MRN: [id number]  DATE: 2021  TIME: 10:56 AM    Assessment and Plan   Acute respiratory disease [J06 9]  1  Acute respiratory disease  Novel Coronavirus (Covid-19),PCR [facility] - Office Collection         Patient Instructions     Vitamin D3 2000 IU daily  Vitamin C 1g every 12 hours  Multivitamin daily  Fluids and rest  Over the counter cold medication as needed  Follow up with PCP in 3-5 days  Proceed to  ER if symptoms worsen  Chief Complaint     Chief Complaint   Patient presents with    COVID-19     cough and runny nose with a primary exposure 5 days ago         History of Present Illness       Reports COVID exposure 5 days ago  URI   This is a new problem  The current episode started in the past 7 days  There has been no fever  Associated symptoms include coughing and rhinorrhea  Pertinent negatives include no abdominal pain, congestion, diarrhea, ear pain, headaches, nausea, rash, sinus pain, sneezing, sore throat, vomiting or wheezing  She has tried nothing for the symptoms  Review of Systems   Review of Systems   Constitutional: Negative for activity change, appetite change, chills, fatigue and fever  HENT: Positive for rhinorrhea  Negative for congestion, ear discharge, ear pain, postnasal drip, sinus pressure, sinus pain, sneezing, sore throat and trouble swallowing  Respiratory: Positive for cough  Negative for chest tightness, shortness of breath and wheezing  Gastrointestinal: Negative for abdominal pain, diarrhea, nausea and vomiting  Musculoskeletal: Negative for myalgias  Skin: Negative for rash  Neurological: Negative for light-headedness and headaches           Current Medications       Current Outpatient Medications:     albuterol (PROVENTIL HFA,VENTOLIN HFA) 90 mcg/act inhaler, Inhale 2 puffs every 6 (six) hours as needed for wheezing, Disp: , Rfl:     Coenzyme Q10 (Co Q 10) 10 MG CAPS, Take by mouth, Disp: , Rfl:     Cyanocobalamin (B-12 PO), Take by mouth daily, Disp: , Rfl:     diphenhydrAMINE (BENADRYL) 25 mg tablet, Take 25 mg by mouth as needed for itching, Disp: , Rfl:     Ergocalciferol (VITAMIN D2 PO), Take by mouth daily, Disp: , Rfl:     glucosamine-chondroitin 500-400 MG tablet, Take 1 tablet by mouth 3 (three) times a day, Disp: , Rfl:     saccharomyces boulardii (Florastor) 250 mg capsule, Take 250 mg by mouth 2 (two) times a day, Disp: , Rfl:     Current Allergies     Allergies as of 01/22/2021 - Reviewed 01/22/2021   Allergen Reaction Noted    Prozac [fluoxetine]  06/24/2018    Vicodin [hydrocodone-acetaminophen] GI Intolerance 06/30/2015            The following portions of the patient's history were reviewed and updated as appropriate: allergies, current medications, past family history, past medical history, past social history, past surgical history and problem list      Past Medical History:   Diagnosis Date    Anxiety     Asthma     Complication of anesthesia     Group beta Strep positive     Hemorrhoids during pregnancy     Urinary tract infection affecting pregnancy     Varicella     Visual impairment        Past Surgical History:   Procedure Laterality Date    ELBOW SURGERY      WISDOM TOOTH EXTRACTION      WRIST SURGERY         Family History   Problem Relation Age of Onset    No Known Problems Mother     No Known Problems Father     Breast cancer Other          Medications have been verified  Objective   Pulse 72   Temp 97 5 °F (36 4 °C)   Resp 16   Ht 5' 7" (1 702 m)   Wt 65 8 kg (145 lb)   SpO2 98%   BMI 22 71 kg/m²   No LMP recorded  Patient has had an implant  Physical Exam     Physical Exam  Vitals signs reviewed  Constitutional:       General: She is not in acute distress  Appearance: She is well-developed  She is not diaphoretic  HENT:      Head: Normocephalic        Right Ear: Tympanic membrane and external ear normal       Left Ear: Tympanic membrane and external ear normal       Nose: Nose normal       Mouth/Throat:      Pharynx: No oropharyngeal exudate or posterior oropharyngeal erythema  Eyes:      Conjunctiva/sclera: Conjunctivae normal    Cardiovascular:      Rate and Rhythm: Normal rate and regular rhythm  Heart sounds: Normal heart sounds  No murmur  No friction rub  No gallop  Pulmonary:      Effort: Pulmonary effort is normal  No respiratory distress  Breath sounds: Normal breath sounds  No wheezing or rales  Chest:      Chest wall: No tenderness  Lymphadenopathy:      Cervical: No cervical adenopathy  Skin:     General: Skin is warm  Neurological:      Mental Status: She is alert and oriented to person, place, and time  Psychiatric:         Behavior: Behavior normal          Thought Content:  Thought content normal          Judgment: Judgment normal  21-May-2021 16:42

## 2021-08-12 ENCOUNTER — OFFICE VISIT (OUTPATIENT)
Dept: OBGYN CLINIC | Facility: CLINIC | Age: 26
End: 2021-08-12
Payer: COMMERCIAL

## 2021-08-12 VITALS
BODY MASS INDEX: 22.29 KG/M2 | SYSTOLIC BLOOD PRESSURE: 120 MMHG | DIASTOLIC BLOOD PRESSURE: 80 MMHG | WEIGHT: 142 LBS | HEIGHT: 67 IN

## 2021-08-12 DIAGNOSIS — N63.20 LEFT BREAST MASS: Primary | ICD-10-CM

## 2021-08-12 PROCEDURE — 99213 OFFICE O/P EST LOW 20 MIN: CPT | Performed by: OBSTETRICS & GYNECOLOGY

## 2021-08-12 RX ORDER — NAPROXEN 500 MG/1
500 TABLET ORAL 2 TIMES DAILY WITH MEALS
COMMUNITY
Start: 2021-06-14 | End: 2022-02-07

## 2021-08-12 RX ORDER — MAGNESIUM OXIDE/MAG AA CHELATE 133 MG
133 TABLET ORAL DAILY
COMMUNITY

## 2021-08-12 NOTE — PROGRESS NOTES
Assessment:  31 y o   who presents with breast lump  Firm, mobile lump palpable; recommend imaging  Plan:  Diagnoses and all orders for this visit:    Left breast mass  -     US breast left limited (diagnostic); Future        __________________________________________________________________    Subjective   Michele Shane is a 31 y o   who presents with concern for breast lump  Patient reports she has a small lump on her left breast  Noticed it Friday  Approx quarter sized, but oblong in shape  Never had anything similar  Mild tenderness with certain movements at work  No nipple discharge or visual changes to breast skin/shape/contour   for at least 1yr with last child  Paternal grandmother with breast ca (but also was exposed to asbestos, so had multiple cancers)  Has menses right now  The following portions of the patient's history were reviewed and updated as appropriate: allergies, current medications, past family history, past medical history, past social history and problem list     Review of Systems  Review of Systems   Constitutional: Negative for chills, fatigue and fever  Respiratory: Negative for cough and shortness of breath  Genitourinary: Positive for vaginal bleeding  Skin: Negative for pallor, rash and wound  Objective  /80   Ht 5' 7" (1 702 m)   Wt 64 4 kg (142 lb)   LMP 2021 (Within Days)   BMI 22 24 kg/m²      Physical Exam:  Physical Exam  Constitutional:       General: She is not in acute distress  Appearance: Normal appearance  She is not ill-appearing, toxic-appearing or diaphoretic  Eyes:      General: No scleral icterus  Right eye: No discharge  Left eye: No discharge  Conjunctiva/sclera: Conjunctivae normal    Cardiovascular:      Rate and Rhythm: Normal rate  Pulmonary:      Effort: Pulmonary effort is normal  No respiratory distress     Chest:      Chest wall: No mass, lacerations or deformity  Breasts: Breasts are symmetrical          Right: No swelling, bleeding, inverted nipple, mass (dense upper outer quadrant without discrete mass), nipple discharge, skin change or tenderness  Left: Mass (firm and mobile, 2cm oblong lump at 5 oclock by inframammary fold  visible under skin when moving mass, but otherwise does not distort contours of breast  also slightly more fullness on upper outer quadrant compared to right without discrete mass) present  No swelling, bleeding, inverted nipple, nipple discharge, skin change or tenderness  Musculoskeletal:         General: No swelling  Skin:     General: Skin is warm and dry  Coloration: Skin is not jaundiced or pale  Findings: No bruising or erythema  Neurological:      Mental Status: She is alert  Psychiatric:         Mood and Affect: Mood normal          Behavior: Behavior normal          Thought Content:  Thought content normal          Judgment: Judgment normal

## 2021-08-20 ENCOUNTER — HOSPITAL ENCOUNTER (OUTPATIENT)
Dept: ULTRASOUND IMAGING | Facility: CLINIC | Age: 26
Discharge: HOME | End: 2021-08-20
Payer: COMMERCIAL

## 2021-08-20 VITALS — BODY MASS INDEX: 22.29 KG/M2 | WEIGHT: 142 LBS | HEIGHT: 67 IN

## 2021-08-20 DIAGNOSIS — N63.20 LEFT BREAST MASS: ICD-10-CM

## 2021-08-20 PROCEDURE — 76642 ULTRASOUND BREAST LIMITED: CPT

## 2021-08-22 DIAGNOSIS — D24.2 BREAST FIBROADENOMA IN FEMALE, LEFT: Primary | ICD-10-CM

## 2021-08-23 ENCOUNTER — TELEPHONE (OUTPATIENT)
Dept: OBGYN CLINIC | Facility: MEDICAL CENTER | Age: 26
End: 2021-08-23

## 2021-08-23 NOTE — TELEPHONE ENCOUNTER
Pt called regarding her US results please review  Per pt- ok to leave a detail message on her machine  She is at work

## 2021-08-23 NOTE — RESULT ENCOUNTER NOTE
As per patient request   Detailed message left on voicemail with result and recommendation for 6 month f/u

## 2021-10-02 ENCOUNTER — APPOINTMENT (OUTPATIENT)
Dept: URGENT CARE | Facility: CLINIC | Age: 26
End: 2021-10-02
Payer: OTHER MISCELLANEOUS

## 2021-10-02 PROCEDURE — 99283 EMERGENCY DEPT VISIT LOW MDM: CPT | Performed by: EMERGENCY MEDICINE

## 2021-10-02 PROCEDURE — 90471 IMMUNIZATION ADMIN: CPT

## 2021-10-02 PROCEDURE — G0382 LEV 3 HOSP TYPE B ED VISIT: HCPCS | Performed by: EMERGENCY MEDICINE

## 2021-10-02 PROCEDURE — 99283 EMERGENCY DEPT VISIT LOW MDM: CPT

## 2021-10-03 ENCOUNTER — HOSPITAL ENCOUNTER (EMERGENCY)
Facility: HOSPITAL | Age: 26
Discharge: HOME | End: 2021-10-03
Attending: EMERGENCY MEDICINE
Payer: OTHER MISCELLANEOUS

## 2021-10-03 ENCOUNTER — APPOINTMENT (OUTPATIENT)
Dept: URGENT CARE | Facility: CLINIC | Age: 26
End: 2021-10-03
Payer: OTHER MISCELLANEOUS

## 2021-10-03 VITALS
SYSTOLIC BLOOD PRESSURE: 121 MMHG | HEIGHT: 67 IN | TEMPERATURE: 97.7 F | HEART RATE: 77 BPM | DIASTOLIC BLOOD PRESSURE: 85 MMHG | RESPIRATION RATE: 16 BRPM | BODY MASS INDEX: 22.76 KG/M2 | WEIGHT: 145 LBS | OXYGEN SATURATION: 98 %

## 2021-10-03 DIAGNOSIS — L03.113 CELLULITIS OF RIGHT HAND: Primary | ICD-10-CM

## 2021-10-03 PROCEDURE — 96365 THER/PROPH/DIAG IV INF INIT: CPT

## 2021-10-03 PROCEDURE — 99213 OFFICE O/P EST LOW 20 MIN: CPT | Performed by: EMERGENCY MEDICINE

## 2021-10-03 PROCEDURE — 90715 TDAP VACCINE 7 YRS/> IM: CPT | Performed by: EMERGENCY MEDICINE

## 2021-10-03 PROCEDURE — 99284 EMERGENCY DEPT VISIT MOD MDM: CPT | Performed by: EMERGENCY MEDICINE

## 2021-10-03 RX ORDER — CEPHALEXIN 500 MG/1
500 CAPSULE ORAL EVERY 6 HOURS SCHEDULED
Qty: 28 CAPSULE | Refills: 0 | Status: SHIPPED | OUTPATIENT
Start: 2021-10-03 | End: 2021-10-10

## 2021-10-03 RX ORDER — CEFAZOLIN SODIUM 2 G/50ML
2000 SOLUTION INTRAVENOUS ONCE
Status: COMPLETED | OUTPATIENT
Start: 2021-10-03 | End: 2021-10-03

## 2021-10-03 RX ADMIN — TETANUS TOXOID, REDUCED DIPHTHERIA TOXOID AND ACELLULAR PERTUSSIS VACCINE, ADSORBED 0.5 ML: 5; 2.5; 8; 8; 2.5 SUSPENSION INTRAMUSCULAR at 01:14

## 2021-10-03 RX ADMIN — CEFAZOLIN SODIUM 2000 MG: 2 SOLUTION INTRAVENOUS at 01:15

## 2022-02-04 NOTE — PROGRESS NOTES
Subjective      Vandana Rossi is a 31 y o  female who presents for annual well woman exam   Last Pap smear 20 NILM  Periods are {gyn period regularity:715}, lasting {numbers; 0-10:79181} days  No intermenstrual bleeding, spotting, or discharge  Current contraception: nexplanon placed 21 effective through 23  History of abnormal Pap smear: no  Family history of uterine or ovarian cancer: no  Regular self breast exam: no  History of abnormal mammogram: to begin at age 35 unless otherwise clinically indicated   Family history of breast cancer: {yes***/no:12198}  History of abnormal lipids: no  Gardasil vaccine: had 1 dose       Menstrual History:  OB History        1    Para   1    Term   1            AB        Living   1       SAB        IAB        Ectopic        Multiple   0    Live Births   1                Menarche age: ***  No LMP recorded  Patient has had an implant  The following portions of the patient's history were reviewed and updated as appropriate: allergies, current medications, past family history, past medical history, past social history, past surgical history and problem list     Review of Systems  Pertinent items are noted in HPI  Objective      There were no vitals taken for this visit      General:   alert and oriented, in no acute distress, alert, appears stated age and cooperative   Heart: regular rate and rhythm, S1, S2 normal, no murmur, click, rub or gallop   Lungs: clear to auscultation bilaterally   Abdomen: soft, non-tender, without masses or organomegaly   Vulva: normal, Bartholin's, Urethra, [place]'s normal   Vagina: normal mucosa, normal discharge, no palpable nodules   Cervix: no cervical motion tenderness and no lesions   Uterus: normal size, non-tender, normal shape and consistency   Adnexa: normal adnexa and no mass, fullness, tenderness   Breast: breasts appear normal, no suspicious masses, no skin or nipple changes or axillary nodes               Assessment      @{Gyn assessment:5268::"well woman"}@   Plan      {gyn plan:77090}     Encouraged healthy diet, exercise and lifestyle  Encouraged follow-up with PCP as needed  Encouraged seasonal influenza vaccination  Gardasil vaccine series reviewed encouraged to complete series  Written information provided  Encouraged social distancing, good hand hygiene, avoidance of crowds and masking    Written information provided about COVID-19    Will call with results  VBI-

## 2022-02-04 NOTE — PATIENT INSTRUCTIONS
Thank you for visiting [facility]  You may be invited to complete a survey about you visit  Your responses will help us improve care we provide  A 10 means the care you received at your visit met your expectations  If you are unable to give a 10 please list reasons so we can work on improving your patient experience  Etonogestrel (Nexplanon, Implanon) - (Implant)     Why this medicine is used:   Prevents pregnancy  Contact a nurse or doctor right away if you have:  · Chest pain, trouble breathing, coughing up blood  · Dark urine or pale stools, yellow skin or eyes  · Numbness or weakness, problems with vision, speech, or walking, leg pain  · Pain in your lower leg (calf) or severe pain in your abdomen  · Heavy vaginal bleeding  · Nausea, vomiting, loss of appetite, stomach pain, headache     Common side effects:  · Acne or pimples, mood changes, weight gain  · Pain, tingling, bleeding, bruising, redness, itching, or swelling where the implant is placed    © Copyright [employer] 2021 Information is for End User's use only and may not be sold, redistributed or otherwise used for commercial purposes

## 2022-02-07 ENCOUNTER — OFFICE VISIT (OUTPATIENT)
Dept: OBGYN CLINIC | Facility: MEDICAL CENTER | Age: 27
End: 2022-02-07
Payer: COMMERCIAL

## 2022-02-07 VITALS
WEIGHT: 143 LBS | DIASTOLIC BLOOD PRESSURE: 70 MMHG | BODY MASS INDEX: 22.44 KG/M2 | HEIGHT: 67 IN | SYSTOLIC BLOOD PRESSURE: 110 MMHG

## 2022-02-07 DIAGNOSIS — Z97.5 NEXPLANON IN PLACE: Primary | ICD-10-CM

## 2022-02-07 DIAGNOSIS — N92.1 BREAKTHROUGH BLEEDING ON NEXPLANON: ICD-10-CM

## 2022-02-07 DIAGNOSIS — Z97.5 BREAKTHROUGH BLEEDING ON NEXPLANON: ICD-10-CM

## 2022-02-07 PROBLEM — Z01.419 ENCNTR FOR GYN EXAM (GENERAL) (ROUTINE) W/O ABN FINDINGS: Status: RESOLVED | Noted: 2018-12-11 | Resolved: 2022-02-07

## 2022-02-07 PROCEDURE — 99213 OFFICE O/P EST LOW 20 MIN: CPT | Performed by: NURSE PRACTITIONER

## 2022-02-07 RX ORDER — NORETHINDRONE ACETATE AND ETHINYL ESTRADIOL 1MG-20(21)
1 KIT ORAL DAILY
Qty: 30 TABLET | Refills: 0 | Status: SHIPPED | OUTPATIENT
Start: 2022-02-07

## 2022-02-07 NOTE — PROGRESS NOTES
Assessment/Plan:      Diagnoses and all orders for this visit:    Liborio Stover in place  -     norethindrone-ethinyl estradiol (Junel FE 1/20) 1-20 MG-MCG per tablet; Take 1 tablet by mouth daily    Breakthrough bleeding on Nexplanon  -     norethindrone-ethinyl estradiol (Junel FE 1/20) 1-20 MG-MCG per tablet; Take 1 tablet by mouth daily      -reviewed with patient Nexplanon side effect is irregular/unpredictable vaginal bleeding  Written information provided  Patient would like to keep Nexplanon if bleeding can be controlled  -options to minimize irregular bleeding while on Nexplanon reviewed  Patient is agreeable to 1 month of OCPs to see if bleeding reorganizes  Quick start method reviewed  Common side effects reviewed  Aches reviewed  RTO 2-3 months for annual exam sooner as needed     Subjective:     Patient ID: Ashlyn Dahl is a 31 y o  female  HPI  here to discuss abnormal uterine bleeding  Menarche at age 14  Unsure of LMP-Nexplanon  For the past 2 months has had bleeding 3-5 days per week  Patient states bleeding is very light has to wear a panty liner  Nexplanon placed 21  Has had irregular bleeding including amenorrhea since that time  Bleeding has not been this consistent  Denies new medications, vitamins and supplements  Denies new partner, vaginal discharge, pelvic pain  Last Pap smear 20 NILM  Gardasil vaccine had 1 dose    Review of Systems   Constitutional: Negative for chills and fatigue  Respiratory: Negative  Cardiovascular: Negative  Genitourinary: Positive for menstrual problem  Objective:  /70   Ht 5' 7" (1 702 m)   Wt 64 9 kg (143 lb)   BMI 22 40 kg/m²      Physical Exam  Constitutional:       Appearance: Normal appearance  Cardiovascular:      Rate and Rhythm: Normal rate and regular rhythm  Heart sounds: Normal heart sounds     Pulmonary:      Effort: Pulmonary effort is normal       Breath sounds: Normal breath sounds  Genitourinary:     General: Normal vulva  Vagina: Normal       Cervix: Normal       Uterus: Normal        Adnexa: Right adnexa normal and left adnexa normal    Neurological:      Mental Status: She is alert and oriented to person, place, and time     Psychiatric:         Mood and Affect: Mood normal          Behavior: Behavior normal

## 2024-02-16 ENCOUNTER — PROCEDURE VISIT (OUTPATIENT)
Dept: OBGYN CLINIC | Facility: MEDICAL CENTER | Age: 29
End: 2024-02-16
Payer: COMMERCIAL

## 2024-02-16 VITALS
WEIGHT: 146 LBS | SYSTOLIC BLOOD PRESSURE: 124 MMHG | HEIGHT: 67 IN | DIASTOLIC BLOOD PRESSURE: 68 MMHG | BODY MASS INDEX: 22.91 KG/M2

## 2024-02-16 DIAGNOSIS — Z30.46 ENCOUNTER FOR NEXPLANON REMOVAL: Primary | ICD-10-CM

## 2024-02-16 PROCEDURE — 11982 REMOVE DRUG IMPLANT DEVICE: CPT | Performed by: STUDENT IN AN ORGANIZED HEALTH CARE EDUCATION/TRAINING PROGRAM

## 2024-02-16 NOTE — PROGRESS NOTES
"[facility] [address], [place], [place]    David Protocol:  Consent: Written consent obtained.  Risks and benefits: risks, benefits and alternatives were discussed  Consent given by: patient  Time out: Immediately prior to procedure a \"time out\" was called to verify the correct patient, procedure, equipment, support staff and site/side marked as required.  Timeout called at: 2/16/2024 10:00 AM.  Patient understanding: patient states understanding of the procedure being performed  Patient consent: the patient's understanding of the procedure matches consent given  Procedure consent: procedure consent matches procedure scheduled  Relevant documents: relevant documents present and verified  Test results: test results available and properly labeled  Site marked: the operative site was marked  Radiology Images displayed and confirmed. If images not available, report reviewed: imaging studies available  Required items: required blood products, implants, devices, and special equipment available  Patient identity confirmed: verbally with patient  Remove and insert drug implant    Date/Time: 2/16/2024 10:00 AM    Performed by: Lisette Santana MD  Authorized by: Lisette Santana MD    Indication:     Indication: Presence of non-biodegradable drug delivery implant    Pre-procedure:     Pre-procedure timeout performed: yes      Prepped with: alcohol 70% and povidone-iodine      The site was cleaned and prepped in a sterile fashion: yes    Procedure:     Procedure:  Removal    Small stab incision was made in arm: yes      Left/right:  Left    Visualization of implant was obtained: yes      Site was closed with steri-strips and pressure bandage applied: yes    Comments:      Uncomplicated nexplanon removal      Lisette Santana MD  [facility]  [facility]  2/16/2024 12:46 PM      "

## 2024-05-02 ENCOUNTER — TELEPHONE (OUTPATIENT)
Age: 29
End: 2024-05-02

## 2024-05-02 NOTE — TELEPHONE ENCOUNTER
Patient calling requesting if anything from her delivery in 2017 showed rectus diastasis.  Reviewed patient chart and none noted.  She voiced appreciation for phone call.